# Patient Record
Sex: MALE | Race: WHITE | NOT HISPANIC OR LATINO | Employment: FULL TIME | URBAN - METROPOLITAN AREA
[De-identification: names, ages, dates, MRNs, and addresses within clinical notes are randomized per-mention and may not be internally consistent; named-entity substitution may affect disease eponyms.]

---

## 2021-05-01 ENCOUNTER — APPOINTMENT (EMERGENCY)
Dept: RADIOLOGY | Facility: HOSPITAL | Age: 46
End: 2021-05-01
Payer: COMMERCIAL

## 2021-05-01 ENCOUNTER — HOSPITAL ENCOUNTER (EMERGENCY)
Facility: HOSPITAL | Age: 46
Discharge: HOME | End: 2021-05-01
Attending: EMERGENCY MEDICINE | Admitting: EMERGENCY MEDICINE
Payer: COMMERCIAL

## 2021-05-01 VITALS
OXYGEN SATURATION: 99 % | DIASTOLIC BLOOD PRESSURE: 96 MMHG | HEIGHT: 70 IN | SYSTOLIC BLOOD PRESSURE: 133 MMHG | WEIGHT: 140 LBS | BODY MASS INDEX: 20.04 KG/M2 | TEMPERATURE: 98.3 F | HEART RATE: 76 BPM | RESPIRATION RATE: 18 BRPM

## 2021-05-01 DIAGNOSIS — L30.9 CHRONIC DERMATITIS: ICD-10-CM

## 2021-05-01 DIAGNOSIS — L03.114 CELLULITIS OF LEFT HAND: Primary | ICD-10-CM

## 2021-05-01 PROCEDURE — 73110 X-RAY EXAM OF WRIST: CPT

## 2021-05-01 PROCEDURE — 99284 EMERGENCY DEPT VISIT MOD MDM: CPT | Performed by: EMERGENCY MEDICINE

## 2021-05-01 PROCEDURE — 99283 EMERGENCY DEPT VISIT LOW MDM: CPT

## 2021-05-01 RX ORDER — CEPHALEXIN 500 MG/1
500 CAPSULE ORAL EVERY 6 HOURS SCHEDULED
Qty: 40 CAPSULE | Refills: 0 | Status: SHIPPED | OUTPATIENT
Start: 2021-05-01 | End: 2021-05-11

## 2021-05-01 RX ORDER — SULFAMETHOXAZOLE AND TRIMETHOPRIM 800; 160 MG/1; MG/1
1 TABLET ORAL ONCE
Status: COMPLETED | OUTPATIENT
Start: 2021-05-01 | End: 2021-05-01

## 2021-05-01 RX ORDER — SULFAMETHOXAZOLE AND TRIMETHOPRIM 800; 160 MG/1; MG/1
1 TABLET ORAL 2 TIMES DAILY
Qty: 14 TABLET | Refills: 0 | Status: SHIPPED | OUTPATIENT
Start: 2021-05-01 | End: 2021-05-08

## 2021-05-01 RX ORDER — TRIAMCINOLONE ACETONIDE 1 MG/G
CREAM TOPICAL 2 TIMES DAILY
Qty: 30 G | Refills: 0 | Status: SHIPPED | OUTPATIENT
Start: 2021-05-01

## 2021-05-01 RX ORDER — CEPHALEXIN 250 MG/1
500 CAPSULE ORAL ONCE
Status: COMPLETED | OUTPATIENT
Start: 2021-05-01 | End: 2021-05-01

## 2021-05-01 RX ADMIN — CEPHALEXIN 500 MG: 250 CAPSULE ORAL at 13:53

## 2021-05-01 RX ADMIN — SULFAMETHOXAZOLE AND TRIMETHOPRIM 1 TABLET: 800; 160 TABLET ORAL at 13:53

## 2021-05-01 NOTE — ED PROVIDER NOTES
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 and has areas where he thinks he has been irritated or developed dermatitis from his concrete exposure  Patient reports that he has rash primarily just on his hands opened areas that are sore and red  He reports that now 1 area on the top of his left hand seems more red  Patient reports he twisted his left wrist working other day and he was concerned he broke his wrist   He reports swelling over the dorsum of his wrist and also over the top of his left hand  He denies any fever or chills  There is no redness going up his arms  He denies any difficulty using hand  Denies any direct trauma  Past medical history shoulder surgery,  Family history noncontributory   Social history on methadone, smoker  None       History reviewed  No pertinent past medical history  Past Surgical History:   Procedure Laterality Date    SHOULDER SURGERY         History reviewed  No pertinent family history  I have reviewed and agree with the history as documented  E-Cigarette/Vaping    E-Cigarette Use Never User      E-Cigarette/Vaping Substances     Social History     Tobacco Use    Smoking status: Current Every Day Smoker     Packs/day: 1 00     Types: Cigarettes    Smokeless tobacco: Never Used   Substance Use Topics    Alcohol use: Never     Frequency: Never    Drug use: Yes     Types: Marijuana     Comment: methadone       Review of Systems   Constitutional: Negative for fever  HENT: Negative for congestion  Eyes: Negative for pain and redness     Respiratory: Negative for cough and shortness of breath  Cardiovascular: Negative for chest pain  Gastrointestinal: Negative for abdominal pain and vomiting  Skin: Positive for rash  Reports left wrist pain    Physical Exam  Physical Exam  Vitals signs and nursing note reviewed  Constitutional:       Appearance: He is well-developed  HENT:      Head: Normocephalic  Right Ear: External ear normal       Left Ear: External ear normal       Nose: Nose normal    Eyes:      General: Lids are normal       Pupils: Pupils are equal, round, and reactive to light  Neck:      Musculoskeletal: Normal range of motion and neck supple  Pulmonary:      Effort: Pulmonary effort is normal  No respiratory distress  Musculoskeletal: Normal range of motion  General: No deformity  Comments: There is mild tenderness over the dorsum of the left wrist, there is an area of erythema essentially at the base of the thumb over the dorsal surface of the left hand approximately 3 x 4 cm consistent with possible cellulitis  Patient has multiple areas of skin breakdown on his hands which appear chronic  Possible dermatitis, possible recurrent skin infections  Skin:     General: Skin is warm and dry  Comments: Multiple areas of skin breakdown, redness with chronic dermatitis very thick weathered skin  No palpable abscess no indication for incision and drainage, no area ascending cellulitis above the wrist   No tendon or nerve involvement   Neurological:      Mental Status: He is alert and oriented to person, place, and time           Vital Signs  ED Triage Vitals [05/01/21 1233]   Temperature Pulse Respirations Blood Pressure SpO2   98 3 °F (36 8 °C) 86 18 160/85 97 %      Temp Source Heart Rate Source Patient Position - Orthostatic VS BP Location FiO2 (%)   Oral Monitor Sitting Right arm --      Pain Score       --           Vitals:    05/01/21 1233 05/01/21 1350   BP: 160/85 133/96   Pulse: 86 76   Patient Position - Orthostatic VS: Sitting Sitting         Visual Acuity      ED Medications  Medications   sulfamethoxazole-trimethoprim (BACTRIM DS) 800-160 mg per tablet 1 tablet (1 tablet Oral Given 5/1/21 1353)   cephalexin (KEFLEX) capsule 500 mg (500 mg Oral Given 5/1/21 1353)       Diagnostic Studies  Results Reviewed     None                 XR wrist 3+ views LEFT   Final Result by Frank Rubio DO (05/01 4757)      No acute osseous abnormality  Soft tissue edema about the hand and wrist       Workstation performed: [id number]                    Procedures  Procedures         ED Course          x-ray left wrist showed no fracture no dislocation no bony lesions, interpreted by me I was initial   MDM medical decision making 57-year-old male reports chronic skin breakdown of both of his hands he believes possibly secondary to concrete exposure  Patient has multiple small areas denuded skin consistent with open scabs on both hands  We discussed steroid cream to reduce inflammation  We discussed antibiotics to cover skin infection he appears to have a cellulitis over the dorsum of his left hand  We discussed follow-up with Hand surgery  Disposition  Final diagnoses:   Cellulitis of left hand   Chronic dermatitis     Time reflects when diagnosis was documented in both MDM as applicable and the Disposition within this note     Time User Action Codes Description Comment    5/1/2021  1:06 PM Sakshi Morales [P82 [id number]] Cellulitis of left hand     5/1/2021  1:06 PM Sakshi Morales [L30 9] Chronic dermatitis       ED Disposition     ED Disposition Condition Date/Time Comment    Discharge Stable Sat May 1, 2021  1:23 PM Maged Coley discharge to home/self care              Follow-up Information     Follow up With Specialties Details Why Donn Agrawal MD Orthopedic Surgery, Hand Surgery, Orthopedics   [facility]  [address]  [address] MD [facility]   [facility]  [phone]            Discharge Medication List as of 5/1/2021  1:23 PM      START taking these medications    Details   cephalexin (KEFLEX) 500 mg capsule Take 1 capsule (500 mg total) by mouth every 6 (six) hours for 10 days, Starting Sat 5/1/2021, Until Tue 5/11/2021, Normal      sulfamethoxazole-trimethoprim (BACTRIM DS) 800-160 mg per tablet Take 1 tablet by mouth 2 (two) times a day for 7 days smx-tmp DS (BACTRIM) 800-160 mg tabs (1tab q12 D10), Starting Sat 5/1/2021, Until Sat 5/8/2021, Normal      triamcinolone (KENALOG) 0 1 % cream Apply topically 2 (two) times a day Both hands, Starting Sat 5/1/2021, Normal           No discharge procedures on file      PDMP Review     None          ED Provider  Electronically Signed by           Glenys Salinas MD  05/01/21 2011

## 2021-05-01 NOTE — DISCHARGE INSTRUCTIONS
You have a skin infection of your left hand  Keflex every  6 hours to fight infection  Bactrim twice daily to fight infection  You have a chronic dermatitis of both hands, triamcinolone cream tear hand surgeries redness and inflammation  Follow up with Hand surgery and Dermatology as needed

## 2021-10-26 ENCOUNTER — HOSPITAL ENCOUNTER (EMERGENCY)
Facility: HOSPITAL | Age: 46
Discharge: LEFT BEFORE DISCHARGE | End: 2021-10-26
Attending: EMERGENCY MEDICINE
Payer: COMMERCIAL

## 2021-10-26 VITALS
WEIGHT: 135 LBS | HEART RATE: 89 BPM | TEMPERATURE: 98.8 F | BODY MASS INDEX: 19.99 KG/M2 | DIASTOLIC BLOOD PRESSURE: 76 MMHG | SYSTOLIC BLOOD PRESSURE: 110 MMHG | RESPIRATION RATE: 19 BRPM | OXYGEN SATURATION: 98 % | HEIGHT: 69 IN

## 2021-10-26 LAB
ALBUMIN SERPL BCP-MCNC: 3.9 G/DL (ref 3.5–5)
ALP SERPL-CCNC: 136 U/L (ref 46–116)
ALT SERPL W P-5'-P-CCNC: 13 U/L (ref 12–78)
ANION GAP SERPL CALCULATED.3IONS-SCNC: 14 MMOL/L (ref 4–13)
AST SERPL W P-5'-P-CCNC: 20 U/L (ref 5–45)
BASOPHILS # BLD AUTO: 0.01 THOUSANDS/ΜL (ref 0–0.1)
BASOPHILS NFR BLD AUTO: 0 % (ref 0–1)
BILIRUB SERPL-MCNC: 0.42 MG/DL (ref 0.2–1)
BUN SERPL-MCNC: 12 MG/DL (ref 5–25)
CALCIUM SERPL-MCNC: 9.4 MG/DL (ref 8.3–10.1)
CHLORIDE SERPL-SCNC: 100 MMOL/L (ref 100–108)
CO2 SERPL-SCNC: 24 MMOL/L (ref 21–32)
CREAT SERPL-MCNC: 0.78 MG/DL (ref 0.6–1.3)
EOSINOPHIL # BLD AUTO: 0.01 THOUSAND/ΜL (ref 0–0.61)
EOSINOPHIL NFR BLD AUTO: 0 % (ref 0–6)
ERYTHROCYTE [DISTWIDTH] IN BLOOD BY AUTOMATED COUNT: 15.1 % (ref 11.6–15.1)
GFR SERPL CREATININE-BSD FRML MDRD: 110 ML/MIN/1.73SQ M
GLUCOSE SERPL-MCNC: 114 MG/DL (ref 65–140)
HCT VFR BLD AUTO: 42.5 % (ref 36.5–49.3)
HGB BLD-MCNC: 14.2 G/DL (ref 12–17)
IMM GRANULOCYTES # BLD AUTO: 0.07 THOUSAND/UL (ref 0–0.2)
IMM GRANULOCYTES NFR BLD AUTO: 1 % (ref 0–2)
LIPASE SERPL-CCNC: 73 U/L (ref 73–393)
LYMPHOCYTES # BLD AUTO: 0.75 THOUSANDS/ΜL (ref 0.6–4.47)
LYMPHOCYTES NFR BLD AUTO: 7 % (ref 14–44)
MCH RBC QN AUTO: 28.1 PG (ref 26.8–34.3)
MCHC RBC AUTO-ENTMCNC: 33.4 G/DL (ref 31.4–37.4)
MCV RBC AUTO: 84 FL (ref 82–98)
MONOCYTES # BLD AUTO: 0.65 THOUSAND/ΜL (ref 0.17–1.22)
MONOCYTES NFR BLD AUTO: 6 % (ref 4–12)
NEUTROPHILS # BLD AUTO: 10.03 THOUSANDS/ΜL (ref 1.85–7.62)
NEUTS SEG NFR BLD AUTO: 86 % (ref 43–75)
NRBC BLD AUTO-RTO: 0 /100 WBCS
PLATELET # BLD AUTO: 359 THOUSANDS/UL (ref 149–390)
PMV BLD AUTO: 9.4 FL (ref 8.9–12.7)
POTASSIUM SERPL-SCNC: 3.9 MMOL/L (ref 3.5–5.3)
PROT SERPL-MCNC: 8.7 G/DL (ref 6.4–8.2)
RBC # BLD AUTO: 5.06 MILLION/UL (ref 3.88–5.62)
SODIUM SERPL-SCNC: 138 MMOL/L (ref 136–145)
WBC # BLD AUTO: 11.52 THOUSAND/UL (ref 4.31–10.16)

## 2021-10-26 PROCEDURE — 85025 COMPLETE CBC W/AUTO DIFF WBC: CPT | Performed by: EMERGENCY MEDICINE

## 2021-10-26 PROCEDURE — 80053 COMPREHEN METABOLIC PANEL: CPT | Performed by: EMERGENCY MEDICINE

## 2021-10-26 PROCEDURE — 36415 COLL VENOUS BLD VENIPUNCTURE: CPT

## 2021-10-26 PROCEDURE — 83690 ASSAY OF LIPASE: CPT | Performed by: EMERGENCY MEDICINE

## 2021-10-26 RX ORDER — ONDANSETRON 2 MG/ML
4 INJECTION INTRAMUSCULAR; INTRAVENOUS ONCE
Status: DISCONTINUED | OUTPATIENT
Start: 2021-10-26 | End: 2021-10-26 | Stop reason: HOSPADM

## 2022-01-07 ENCOUNTER — HOSPITAL ENCOUNTER (EMERGENCY)
Facility: HOSPITAL | Age: 47
Discharge: HOME | End: 2022-01-07
Attending: EMERGENCY MEDICINE | Admitting: EMERGENCY MEDICINE

## 2022-01-07 DIAGNOSIS — L02.415 CELLULITIS AND ABSCESS OF RIGHT LEG: Primary | ICD-10-CM

## 2022-01-07 DIAGNOSIS — L03.115 CELLULITIS AND ABSCESS OF RIGHT LEG: Primary | ICD-10-CM

## 2022-01-07 PROCEDURE — 90715 TDAP VACCINE 7 YRS/> IM: CPT | Performed by: EMERGENCY MEDICINE

## 2022-01-07 PROCEDURE — 99285 EMERGENCY DEPT VISIT HI MDM: CPT | Performed by: EMERGENCY MEDICINE

## 2022-01-07 PROCEDURE — 90471 IMMUNIZATION ADMIN: CPT

## 2022-01-07 PROCEDURE — 10061 I&D ABSCESS COMP/MULTIPLE: CPT | Performed by: EMERGENCY MEDICINE

## 2022-01-07 PROCEDURE — 99283 EMERGENCY DEPT VISIT LOW MDM: CPT

## 2022-01-07 RX ORDER — CEPHALEXIN 250 MG/1
500 CAPSULE ORAL ONCE
Status: COMPLETED | OUTPATIENT
Start: 2022-01-07 | End: 2022-01-07

## 2022-01-07 RX ORDER — NAPROXEN 250 MG/1
250 TABLET ORAL 2 TIMES DAILY WITH MEALS
Qty: 20 TABLET | Refills: 0 | Status: SHIPPED | OUTPATIENT
Start: 2022-01-07

## 2022-01-07 RX ORDER — LIDOCAINE HYDROCHLORIDE AND EPINEPHRINE 20; 5 MG/ML; UG/ML
1 INJECTION, SOLUTION EPIDURAL; INFILTRATION; INTRACAUDAL; PERINEURAL ONCE
Status: COMPLETED | OUTPATIENT
Start: 2022-01-07 | End: 2022-01-07

## 2022-01-07 RX ORDER — SULFAMETHOXAZOLE AND TRIMETHOPRIM 800; 160 MG/1; MG/1
1 TABLET ORAL ONCE
Status: COMPLETED | OUTPATIENT
Start: 2022-01-07 | End: 2022-01-07

## 2022-01-07 RX ORDER — CEPHALEXIN 250 MG/1
500 CAPSULE ORAL EVERY 6 HOURS SCHEDULED
Qty: 56 CAPSULE | Refills: 0 | Status: SHIPPED | OUTPATIENT
Start: 2022-01-07 | End: 2022-01-14

## 2022-01-07 RX ORDER — SULFAMETHOXAZOLE AND TRIMETHOPRIM 800; 160 MG/1; MG/1
1 TABLET ORAL 2 TIMES DAILY
Qty: 14 TABLET | Refills: 0 | Status: SHIPPED | OUTPATIENT
Start: 2022-01-07 | End: 2022-01-14

## 2022-01-07 RX ADMIN — LIDOCAINE HYDROCHLORIDE AND EPINEPHRINE 1 ML: 20; 5 INJECTION, SOLUTION EPIDURAL; INFILTRATION; INTRACAUDAL; PERINEURAL at 21:46

## 2022-01-07 RX ADMIN — CEPHALEXIN 500 MG: 250 CAPSULE ORAL at 21:45

## 2022-01-07 RX ADMIN — TETANUS TOXOID, REDUCED DIPHTHERIA TOXOID AND ACELLULAR PERTUSSIS VACCINE, ADSORBED 0.5 ML: 5; 2.5; 8; 8; 2.5 SUSPENSION INTRAMUSCULAR at 22:07

## 2022-01-07 RX ADMIN — SULFAMETHOXAZOLE AND TRIMETHOPRIM 1 TABLET: 800; 160 TABLET ORAL at 21:45

## 2022-01-08 VITALS
WEIGHT: 135 LBS | SYSTOLIC BLOOD PRESSURE: 123 MMHG | HEART RATE: 87 BPM | BODY MASS INDEX: 19.94 KG/M2 | OXYGEN SATURATION: 97 % | DIASTOLIC BLOOD PRESSURE: 76 MMHG | TEMPERATURE: 99.8 F | RESPIRATION RATE: 18 BRPM

## 2022-01-08 NOTE — ED PROVIDER NOTES
Pt Name: Ricci Calhoun  MRN: [id number]  [place] 1975  Age/Sex: 42 y o  male  Date of evaluation: 1/7/2022  PCP: No primary care provider on file  CHIEF COMPLAINT    Chief Complaint   Patient presents with    Leg Swelling     cut leg on possibly a piece of metal or wire, noticed 2 days ago that leg was swollen red and draining  HPI    42 y o  male presenting with right leg swelling and pain  Patient states he cut his leg on a piece of metal or wire several days ago while working on some rebar, states that 2 days ago the leg became red and slightly swollen  He states that he saw a boil on the skin, attempt to squeeze the pus out, express some pus but was not able to get it all out  He has had increased redness since then  Denies fever, nausea, vomiting, diarrhea,  other symptoms  He does not remember his last tetanus shot  Patient currently maintained on methadone therapy, states his last IV drug use was approximately 3 weeks ago  HPI      Past Medical and Surgical History    History reviewed  No pertinent past medical history  Past Surgical History:   Procedure Laterality Date    SHOULDER SURGERY         History reviewed  No pertinent family history  Social History     Tobacco Use    Smoking status: Current Every Day Smoker     Packs/day: 1 00     Types: Cigarettes    Smokeless tobacco: Never Used   Vaping Use    Vaping Use: Never used   Substance Use Topics    Alcohol use: Never    Drug use: Yes     Types: Marijuana     Comment: methadone           Allergies    No Known Allergies    Home Medications    Prior to Admission medications    Medication Sig Start Date End Date Taking? Authorizing Provider   triamcinolone (KENALOG) 0 1 % cream Apply topically 2 (two) times a day Both hands 5/1/21   Alize Arshad MD           Review of Systems    Review of Systems   Constitutional: Negative for appetite change, chills and diaphoresis     HENT: Negative for drooling, facial swelling, trouble swallowing and voice change  Respiratory: Negative for apnea, shortness of breath and wheezing  Cardiovascular: Negative for chest pain and leg swelling  Gastrointestinal: Negative for abdominal distention, abdominal pain, diarrhea, nausea and vomiting  Genitourinary: Negative for dysuria and urgency  Musculoskeletal: Negative for arthralgias, back pain, gait problem and neck pain  Skin: Positive for rash and wound  Negative for color change  Neurological: Negative for seizures, speech difficulty, weakness and headaches  Psychiatric/Behavioral: Negative for agitation, behavioral problems and dysphoric mood  The patient is not nervous/anxious  All other systems reviewed and negative  Physical Exam      ED Triage Vitals   Temperature Pulse Respirations Blood Pressure SpO2   01/07/22 1605 01/07/22 1605 01/07/22 1605 01/07/22 1605 01/07/22 1605   99 8 °F (37 7 °C) (!) 108 18 140/77 98 %      Temp Source Heart Rate Source Patient Position - Orthostatic VS BP Location FiO2 (%)   01/07/22 1605 01/07/22 1605 01/07/22 1605 01/07/22 1605 --   Oral Monitor Sitting Right arm       Pain Score       01/07/22 2223       7               Physical Exam  Vitals and nursing note reviewed  Constitutional:       Appearance: He is well-developed  HENT:      Head: Normocephalic and atraumatic  Right Ear: External ear normal       Left Ear: External ear normal    Eyes:      Conjunctiva/sclera: Conjunctivae normal       Pupils: Pupils are equal, round, and reactive to light  Neck:      Trachea: No tracheal deviation  Cardiovascular:      Rate and Rhythm: Normal rate and regular rhythm  Heart sounds: Normal heart sounds  No murmur heard  Pulmonary:      Effort: Pulmonary effort is normal  No respiratory distress  Breath sounds: Normal breath sounds  No stridor  No wheezing or rales  Abdominal:      General: There is no distension  Palpations: Abdomen is soft  Tenderness: There is no abdominal tenderness  There is no guarding or rebound  Musculoskeletal:         General: Swelling and tenderness present  No deformity  Normal range of motion  Cervical back: Normal range of motion and neck supple  Comments: Right lower extremity swollen, erythematous, tender to palpation, approximately 3 x 5 cm fluctuant area noted to the anterior aspect of the right lower leg with small purulent drainage  Strength sensation pulse and cap refill intact distal    Skin:     General: Skin is warm and dry  Findings: No rash  Neurological:      Mental Status: He is alert and oriented to person, place, and time  Psychiatric:         Behavior: Behavior normal          Thought Content: Thought content normal          Judgment: Judgment normal               Diagnostic Results      Labs:    Results Reviewed     None          All labs reviewed and utilized in the medical decision making process    Radiology:    No orders to display       All radiology studies independently viewed by me and interpreted by the radiologist     Procedure    Incision and drain    Date/Time: 1/7/2022 10:00 PM  Performed by: Julia Montes MD  Authorized by: Julia Montes MD   Universal Protocol:  Risks and benefits: risks, benefits and alternatives were discussed  Consent given by: patient  Time out: Immediately prior to procedure a "time out" was called to verify the correct patient, procedure, equipment, support staff and site/side marked as required  Patient identity confirmed: provided demographic data      Patient location:  ED  Location:     Type:  Abscess    Size:  3x5 cm    Location:  Lower extremity    Lower extremity location:  R leg  Pre-procedure details:     Skin preparation:  Betadine  Anesthesia (see MAR for exact dosages):      Anesthesia method:  Local infiltration    Local anesthetic:  Lidocaine 2% WITH epi  Procedure details:     Complexity:  Intermediate    Incision types: Single straight    Scalpel blade:  11    Approach:  Open    Incision depth:  Subcutaneous    Wound management:  Probed and deloculated and extensive cleaning    Drainage:  Purulent    Drainage amount:  Copious    Wound treatment:  Wound left open    Packing materials:  None  Post-procedure details:     Patient tolerance of procedure: Tolerated well, no immediate complications            ED Course of Care and Re-Assessments      Symptoms improved substantially with antibiotics and incision and drainage  Tetanus updated  Medications   sulfamethoxazole-trimethoprim (BACTRIM DS) 800-160 mg per tablet 1 tablet (1 tablet Oral Given 1/7/22 2145)   cephalexin (KEFLEX) capsule 500 mg (500 mg Oral Given 1/7/22 2145)   lidocaine-epinephrine (XYLOCAINE-MPF/EPINEPHRINE) 2 %-1:200,000 injection 1 mL (1 mL Infiltration Given 1/7/22 2146)   tetanus-diphtheria-acellular pertussis (BOOSTRIX) IM injection 0 5 mL (0 5 mL Intramuscular Given 1/7/22 2207)           FINAL IMPRESSION    Final diagnoses:   Cellulitis and abscess of right leg         DISPOSITION/PLAN    Presentation as above with cellulitis and abscess of the right leg  No systemic symptoms  Patient started on broad-spectrum antibiotics with coverage for staph and strep as well as MRSA, tetanus updated, incised and drained as above  Counseled extensively regarding wound care, discharged strict return precautions, follow up primary care doctor or with ER for wound check in 48 hours  Hemodynamically stable and comfortable at time of discharge  Time reflects when diagnosis was documented in both MDM as applicable and the Disposition within this note     Time User Action Codes Description Comment    1/7/2022 10:10 PM Stacey Nielsen Add [V93 [id number],  L02 415] Cellulitis and abscess of right leg       ED Disposition     ED Disposition Condition Date/Time Comment    Discharge Stable Fri Jan 7, 2022 10:09 PM Tiana Miller discharge to home/self care              Follow-up Information     Follow up With Specialties Details Why Contact Info Additional [address] [facility] Emergency Medicine Go to  If symptoms worsen or in 48 hours for a wound recheck [address] [facility] Emergency Department, [address] [place], [place], [address] [facility] Internal Medicine Call in 3 days To establish primary care  and schedule close outpatient follow-up [address]:    [address] [place] [address] Emergency Department  [address] [place] [address] [phone]  Go to   If symptoms worsen or in 48 hours for a wound recheck    Dave Carter DO  [address] [address]  [phone]    Call in 3 days  To establish primary care  and schedule close outpatient follow-up      DISCHARGE MEDICATIONS:    Discharge Medication List as of 1/7/2022 10:11 PM      START taking these medications    Details   cephalexin (KEFLEX) 250 mg capsule Take 2 capsules (500 mg total) by mouth every 6 (six) hours for 7 days, Starting Fri 1/7/2022, Until Fri 1/14/2022, Print      sulfamethoxazole-trimethoprim (BACTRIM DS) 800-160 mg per tablet Take 1 tablet by mouth 2 (two) times a day for 7 days smx-tmp DS (BACTRIM) 800-160 mg tabs (1tab q12 D10), Starting Fri 1/7/2022, Until Fri 1/14/2022, Print         CONTINUE these medications which have NOT CHANGED    Details   triamcinolone (KENALOG) 0 1 % cream Apply topically 2 (two) times a day Both hands, Starting Sat 5/1/2021, Normal             No discharge procedures on file  Shaun Stoll MD    Portions of the record may have been created with voice recognition software    Occasional wrong word or "sound alike" substitutions may have occurred due to the inherent limitations of voice recognition software    Please read the chart carefully and recognize, using context, where substitutions have occurred     Shaun Stoll MD  01/08/22 0124

## 2022-05-15 ENCOUNTER — APPOINTMENT (EMERGENCY)
Dept: RADIOLOGY | Facility: HOSPITAL | Age: 47
DRG: 364 | End: 2022-05-15
Payer: COMMERCIAL

## 2022-05-15 ENCOUNTER — ANESTHESIA EVENT (INPATIENT)
Dept: PERIOP | Facility: HOSPITAL | Age: 47
DRG: 364 | End: 2022-05-15
Payer: COMMERCIAL

## 2022-05-15 ENCOUNTER — APPOINTMENT (EMERGENCY)
Dept: CT IMAGING | Facility: HOSPITAL | Age: 47
DRG: 364 | End: 2022-05-15
Payer: COMMERCIAL

## 2022-05-15 ENCOUNTER — HOSPITAL ENCOUNTER (INPATIENT)
Facility: HOSPITAL | Age: 47
LOS: 1 days | Discharge: HOME | DRG: 364 | End: 2022-05-16
Attending: EMERGENCY MEDICINE | Admitting: SURGERY
Payer: COMMERCIAL

## 2022-05-15 ENCOUNTER — ANESTHESIA (INPATIENT)
Dept: PERIOP | Facility: HOSPITAL | Age: 47
DRG: 364 | End: 2022-05-15
Payer: COMMERCIAL

## 2022-05-15 DIAGNOSIS — L02.91 ABSCESS: Primary | ICD-10-CM

## 2022-05-15 DIAGNOSIS — F19.10 IV DRUG ABUSE (HCC): Chronic | ICD-10-CM

## 2022-05-15 DIAGNOSIS — L02.414 ABSCESS OF FOREARM, LEFT: ICD-10-CM

## 2022-05-15 PROBLEM — F17.200 SMOKING: Status: ACTIVE | Noted: 2022-05-15

## 2022-05-15 LAB
4HR DELTA HS TROPONIN: <0 NG/L
ALBUMIN SERPL BCP-MCNC: 3.5 G/DL (ref 3.5–5)
ALP SERPL-CCNC: 102 U/L (ref 46–116)
ALT SERPL W P-5'-P-CCNC: 17 U/L (ref 12–78)
ANION GAP SERPL CALCULATED.3IONS-SCNC: 9 MMOL/L (ref 4–13)
APTT PPP: 30 SECONDS (ref 23–37)
AST SERPL W P-5'-P-CCNC: 10 U/L (ref 5–45)
ATRIAL RATE: 85 BPM
BASE EX.OXY STD BLDV CALC-SCNC: 47 % (ref 60–80)
BASE EXCESS BLDV CALC-SCNC: 1.1 MMOL/L
BASOPHILS # BLD AUTO: 0.05 THOUSANDS/ΜL (ref 0–0.1)
BASOPHILS NFR BLD AUTO: 0 % (ref 0–1)
BILIRUB SERPL-MCNC: 0.36 MG/DL (ref 0.2–1)
BUN SERPL-MCNC: 21 MG/DL (ref 5–25)
CALCIUM SERPL-MCNC: 8.9 MG/DL (ref 8.3–10.1)
CARDIAC TROPONIN I PNL SERPL HS: 2 NG/L
CARDIAC TROPONIN I PNL SERPL HS: <2 NG/L
CHLORIDE SERPL-SCNC: 95 MMOL/L (ref 100–108)
CO2 SERPL-SCNC: 29 MMOL/L (ref 21–32)
CREAT SERPL-MCNC: 0.99 MG/DL (ref 0.6–1.3)
EOSINOPHIL # BLD AUTO: 0.08 THOUSAND/ΜL (ref 0–0.61)
EOSINOPHIL NFR BLD AUTO: 0 % (ref 0–6)
ERYTHROCYTE [DISTWIDTH] IN BLOOD BY AUTOMATED COUNT: 13.1 % (ref 11.6–15.1)
GFR SERPL CREATININE-BSD FRML MDRD: 91 ML/MIN/1.73SQ M
GLUCOSE SERPL-MCNC: 130 MG/DL (ref 65–140)
HCO3 BLDV-SCNC: 28 MMOL/L (ref 24–30)
HCT VFR BLD AUTO: 41.1 % (ref 36.5–49.3)
HGB BLD-MCNC: 13.6 G/DL (ref 12–17)
IMM GRANULOCYTES # BLD AUTO: 0.13 THOUSAND/UL (ref 0–0.2)
IMM GRANULOCYTES NFR BLD AUTO: 1 % (ref 0–2)
INR PPP: 1.08 (ref 0.84–1.19)
LACTATE SERPL-SCNC: 0.5 MMOL/L (ref 0.5–2)
LACTATE SERPL-SCNC: 1.8 MMOL/L (ref 0.5–2)
LIPASE SERPL-CCNC: 38 U/L (ref 73–393)
LYMPHOCYTES # BLD AUTO: 1.88 THOUSANDS/ΜL (ref 0.6–4.47)
LYMPHOCYTES NFR BLD AUTO: 10 % (ref 14–44)
MAGNESIUM SERPL-MCNC: 1.9 MG/DL (ref 1.6–2.6)
MCH RBC QN AUTO: 29.9 PG (ref 26.8–34.3)
MCHC RBC AUTO-ENTMCNC: 33.1 G/DL (ref 31.4–37.4)
MCV RBC AUTO: 90 FL (ref 82–98)
MONOCYTES # BLD AUTO: 1.4 THOUSAND/ΜL (ref 0.17–1.22)
MONOCYTES NFR BLD AUTO: 8 % (ref 4–12)
NEUTROPHILS # BLD AUTO: 15.09 THOUSANDS/ΜL (ref 1.85–7.62)
NEUTS SEG NFR BLD AUTO: 81 % (ref 43–75)
NRBC BLD AUTO-RTO: 0 /100 WBCS
NT-PROBNP SERPL-MCNC: 32 PG/ML
O2 CT BLDV-SCNC: 9.4 ML/DL
P AXIS: 78 DEGREES
PCO2 BLDV: 53.8 MM HG (ref 42–50)
PH BLDV: 7.33 [PH] (ref 7.3–7.4)
PLATELET # BLD AUTO: 540 THOUSANDS/UL (ref 149–390)
PMV BLD AUTO: 8.5 FL (ref 8.9–12.7)
PO2 BLDV: 26.7 MM HG (ref 35–45)
POTASSIUM SERPL-SCNC: 4.1 MMOL/L (ref 3.5–5.3)
PR INTERVAL: 146 MS
PROCALCITONIN SERPL-MCNC: <0.05 NG/ML
PROT SERPL-MCNC: 7.8 G/DL (ref 6.4–8.2)
PROTHROMBIN TIME: 13.5 SECONDS (ref 11.6–14.5)
QRS AXIS: 85 DEGREES
QRSD INTERVAL: 88 MS
QT INTERVAL: 358 MS
QTC INTERVAL: 426 MS
RBC # BLD AUTO: 4.55 MILLION/UL (ref 3.88–5.62)
SODIUM SERPL-SCNC: 133 MMOL/L (ref 136–145)
T WAVE AXIS: 86 DEGREES
VENTRICULAR RATE: 85 BPM
WBC # BLD AUTO: 18.63 THOUSAND/UL (ref 4.31–10.16)

## 2022-05-15 PROCEDURE — 87186 SC STD MICRODIL/AGAR DIL: CPT | Performed by: SURGERY

## 2022-05-15 PROCEDURE — 93010 ELECTROCARDIOGRAM REPORT: CPT | Performed by: INTERNAL MEDICINE

## 2022-05-15 PROCEDURE — 99222 1ST HOSP IP/OBS MODERATE 55: CPT | Performed by: SURGERY

## 2022-05-15 PROCEDURE — 87070 CULTURE OTHR SPECIMN AEROBIC: CPT | Performed by: SURGERY

## 2022-05-15 PROCEDURE — 85730 THROMBOPLASTIN TIME PARTIAL: CPT | Performed by: EMERGENCY MEDICINE

## 2022-05-15 PROCEDURE — 83605 ASSAY OF LACTIC ACID: CPT | Performed by: SURGERY

## 2022-05-15 PROCEDURE — 73200 CT UPPER EXTREMITY W/O DYE: CPT

## 2022-05-15 PROCEDURE — 87040 BLOOD CULTURE FOR BACTERIA: CPT | Performed by: EMERGENCY MEDICINE

## 2022-05-15 PROCEDURE — 71046 X-RAY EXAM CHEST 2 VIEWS: CPT

## 2022-05-15 PROCEDURE — 36415 COLL VENOUS BLD VENIPUNCTURE: CPT | Performed by: EMERGENCY MEDICINE

## 2022-05-15 PROCEDURE — G1004 CDSM NDSC: HCPCS

## 2022-05-15 PROCEDURE — 80053 COMPREHEN METABOLIC PANEL: CPT | Performed by: EMERGENCY MEDICINE

## 2022-05-15 PROCEDURE — 84484 ASSAY OF TROPONIN QUANT: CPT | Performed by: EMERGENCY MEDICINE

## 2022-05-15 PROCEDURE — 93005 ELECTROCARDIOGRAM TRACING: CPT

## 2022-05-15 PROCEDURE — 82805 BLOOD GASES W/O2 SATURATION: CPT | Performed by: EMERGENCY MEDICINE

## 2022-05-15 PROCEDURE — 84484 ASSAY OF TROPONIN QUANT: CPT | Performed by: SURGERY

## 2022-05-15 PROCEDURE — 83880 ASSAY OF NATRIURETIC PEPTIDE: CPT | Performed by: EMERGENCY MEDICINE

## 2022-05-15 PROCEDURE — 87205 SMEAR GRAM STAIN: CPT | Performed by: SURGERY

## 2022-05-15 PROCEDURE — 83735 ASSAY OF MAGNESIUM: CPT | Performed by: EMERGENCY MEDICINE

## 2022-05-15 PROCEDURE — 99284 EMERGENCY DEPT VISIT MOD MDM: CPT | Performed by: EMERGENCY MEDICINE

## 2022-05-15 PROCEDURE — 99285 EMERGENCY DEPT VISIT HI MDM: CPT

## 2022-05-15 PROCEDURE — 83690 ASSAY OF LIPASE: CPT | Performed by: EMERGENCY MEDICINE

## 2022-05-15 PROCEDURE — 85025 COMPLETE CBC W/AUTO DIFF WBC: CPT | Performed by: EMERGENCY MEDICINE

## 2022-05-15 PROCEDURE — 87075 CULTR BACTERIA EXCEPT BLOOD: CPT | Performed by: SURGERY

## 2022-05-15 PROCEDURE — 84145 PROCALCITONIN (PCT): CPT | Performed by: EMERGENCY MEDICINE

## 2022-05-15 PROCEDURE — 83605 ASSAY OF LACTIC ACID: CPT | Performed by: EMERGENCY MEDICINE

## 2022-05-15 PROCEDURE — 0J9H0ZZ DRAINAGE OF LEFT LOWER ARM SUBCUTANEOUS TISSUE AND FASCIA, OPEN APPROACH: ICD-10-PCS | Performed by: SURGERY

## 2022-05-15 PROCEDURE — 10061 I&D ABSCESS COMP/MULTIPLE: CPT | Performed by: SURGERY

## 2022-05-15 PROCEDURE — 85610 PROTHROMBIN TIME: CPT | Performed by: EMERGENCY MEDICINE

## 2022-05-15 RX ORDER — VANCOMYCIN HYDROCHLORIDE 1 G/200ML
15 INJECTION, SOLUTION INTRAVENOUS EVERY 12 HOURS
Status: DISCONTINUED | OUTPATIENT
Start: 2022-05-15 | End: 2022-05-16 | Stop reason: HOSPADM

## 2022-05-15 RX ORDER — SODIUM CHLORIDE, SODIUM GLUCONATE, SODIUM ACETATE, POTASSIUM CHLORIDE, MAGNESIUM CHLORIDE, SODIUM PHOSPHATE, DIBASIC, AND POTASSIUM PHOSPHATE .53; .5; .37; .037; .03; .012; .00082 G/100ML; G/100ML; G/100ML; G/100ML; G/100ML; G/100ML; G/100ML
1000 INJECTION, SOLUTION INTRAVENOUS ONCE
Status: COMPLETED | OUTPATIENT
Start: 2022-05-15 | End: 2022-05-15

## 2022-05-15 RX ORDER — ONDANSETRON 2 MG/ML
4 INJECTION INTRAMUSCULAR; INTRAVENOUS EVERY 6 HOURS PRN
Status: DISCONTINUED | OUTPATIENT
Start: 2022-05-15 | End: 2022-05-16 | Stop reason: HOSPADM

## 2022-05-15 RX ORDER — HEPARIN SODIUM 5000 [USP'U]/ML
INJECTION, SOLUTION INTRAVENOUS; SUBCUTANEOUS AS NEEDED
Status: DISCONTINUED | OUTPATIENT
Start: 2022-05-15 | End: 2022-05-15

## 2022-05-15 RX ORDER — CEFAZOLIN SODIUM 1 G/50ML
1000 SOLUTION INTRAVENOUS EVERY 8 HOURS
Status: ACTIVE | OUTPATIENT
Start: 2022-05-15 | End: 2022-05-15

## 2022-05-15 RX ORDER — SUCCINYLCHOLINE/SOD CL,ISO/PF 100 MG/5ML
SYRINGE (ML) INTRAVENOUS AS NEEDED
Status: DISCONTINUED | OUTPATIENT
Start: 2022-05-15 | End: 2022-05-15

## 2022-05-15 RX ORDER — MIDAZOLAM HYDROCHLORIDE 2 MG/2ML
INJECTION, SOLUTION INTRAMUSCULAR; INTRAVENOUS AS NEEDED
Status: DISCONTINUED | OUTPATIENT
Start: 2022-05-15 | End: 2022-05-15

## 2022-05-15 RX ORDER — PROPOFOL 10 MG/ML
INJECTION, EMULSION INTRAVENOUS AS NEEDED
Status: DISCONTINUED | OUTPATIENT
Start: 2022-05-15 | End: 2022-05-15

## 2022-05-15 RX ORDER — PROMETHAZINE HYDROCHLORIDE 25 MG/ML
12.5 INJECTION, SOLUTION INTRAMUSCULAR; INTRAVENOUS ONCE AS NEEDED
Status: DISCONTINUED | OUTPATIENT
Start: 2022-05-15 | End: 2022-05-15 | Stop reason: HOSPADM

## 2022-05-15 RX ORDER — IBUPROFEN 800 MG/1
TABLET ORAL EVERY 6 HOURS PRN
COMMUNITY

## 2022-05-15 RX ORDER — MAGNESIUM HYDROXIDE 1200 MG/15ML
LIQUID ORAL AS NEEDED
Status: DISCONTINUED | OUTPATIENT
Start: 2022-05-15 | End: 2022-05-15 | Stop reason: HOSPADM

## 2022-05-15 RX ORDER — FENTANYL CITRATE 50 UG/ML
INJECTION, SOLUTION INTRAMUSCULAR; INTRAVENOUS AS NEEDED
Status: DISCONTINUED | OUTPATIENT
Start: 2022-05-15 | End: 2022-05-15

## 2022-05-15 RX ORDER — HEPARIN SODIUM 5000 [USP'U]/ML
5000 INJECTION, SOLUTION INTRAVENOUS; SUBCUTANEOUS EVERY 8 HOURS SCHEDULED
Status: DISCONTINUED | OUTPATIENT
Start: 2022-05-15 | End: 2022-05-16 | Stop reason: HOSPADM

## 2022-05-15 RX ORDER — ACETAMINOPHEN 325 MG/1
975 TABLET ORAL EVERY 8 HOURS PRN
Status: DISCONTINUED | OUTPATIENT
Start: 2022-05-15 | End: 2022-05-16 | Stop reason: HOSPADM

## 2022-05-15 RX ORDER — DEXAMETHASONE SODIUM PHOSPHATE 10 MG/ML
INJECTION, SOLUTION INTRAMUSCULAR; INTRAVENOUS AS NEEDED
Status: DISCONTINUED | OUTPATIENT
Start: 2022-05-15 | End: 2022-05-15

## 2022-05-15 RX ORDER — DIPHENHYDRAMINE HYDROCHLORIDE 50 MG/ML
12.5 INJECTION INTRAMUSCULAR; INTRAVENOUS ONCE AS NEEDED
Status: DISCONTINUED | OUTPATIENT
Start: 2022-05-15 | End: 2022-05-15 | Stop reason: HOSPADM

## 2022-05-15 RX ORDER — CEFAZOLIN SODIUM 1 G/50ML
SOLUTION INTRAVENOUS AS NEEDED
Status: DISCONTINUED | OUTPATIENT
Start: 2022-05-15 | End: 2022-05-15

## 2022-05-15 RX ORDER — CEFAZOLIN SODIUM 1 G/50ML
1000 SOLUTION INTRAVENOUS
Status: DISPENSED | OUTPATIENT
Start: 2022-05-15 | End: 2022-05-16

## 2022-05-15 RX ORDER — ONDANSETRON 2 MG/ML
INJECTION INTRAMUSCULAR; INTRAVENOUS AS NEEDED
Status: DISCONTINUED | OUTPATIENT
Start: 2022-05-15 | End: 2022-05-15

## 2022-05-15 RX ORDER — LIDOCAINE HYDROCHLORIDE 20 MG/ML
INJECTION, SOLUTION EPIDURAL; INFILTRATION; INTRACAUDAL; PERINEURAL AS NEEDED
Status: DISCONTINUED | OUTPATIENT
Start: 2022-05-15 | End: 2022-05-15

## 2022-05-15 RX ORDER — FENTANYL CITRATE/PF 50 MCG/ML
50 SYRINGE (ML) INJECTION
Status: DISCONTINUED | OUTPATIENT
Start: 2022-05-15 | End: 2022-05-15 | Stop reason: HOSPADM

## 2022-05-15 RX ORDER — SODIUM CHLORIDE, SODIUM LACTATE, POTASSIUM CHLORIDE, CALCIUM CHLORIDE 600; 310; 30; 20 MG/100ML; MG/100ML; MG/100ML; MG/100ML
125 INJECTION, SOLUTION INTRAVENOUS CONTINUOUS
Status: DISCONTINUED | OUTPATIENT
Start: 2022-05-15 | End: 2022-05-15

## 2022-05-15 RX ORDER — HYDROMORPHONE HCL/PF 1 MG/ML
0.5 SYRINGE (ML) INJECTION
Status: DISCONTINUED | OUTPATIENT
Start: 2022-05-15 | End: 2022-05-15 | Stop reason: HOSPADM

## 2022-05-15 RX ADMIN — SODIUM CHLORIDE, SODIUM LACTATE, POTASSIUM CHLORIDE, AND CALCIUM CHLORIDE: .6; .31; .03; .02 INJECTION, SOLUTION INTRAVENOUS at 12:36

## 2022-05-15 RX ADMIN — FENTANYL CITRATE 100 MCG: 50 INJECTION, SOLUTION INTRAMUSCULAR; INTRAVENOUS at 12:41

## 2022-05-15 RX ADMIN — VANCOMYCIN HYDROCHLORIDE 1000 MG: 1 INJECTION, SOLUTION INTRAVENOUS at 13:54

## 2022-05-15 RX ADMIN — ONDANSETRON 4 MG: 2 INJECTION INTRAMUSCULAR; INTRAVENOUS at 13:04

## 2022-05-15 RX ADMIN — FENTANYL CITRATE 50 MCG: 50 INJECTION INTRAMUSCULAR; INTRAVENOUS at 13:50

## 2022-05-15 RX ADMIN — SODIUM CHLORIDE, SODIUM GLUCONATE, SODIUM ACETATE, POTASSIUM CHLORIDE, MAGNESIUM CHLORIDE, SODIUM PHOSPHATE, DIBASIC, AND POTASSIUM PHOSPHATE 1000 ML: .53; .5; .37; .037; .03; .012; .00082 INJECTION, SOLUTION INTRAVENOUS at 12:01

## 2022-05-15 RX ADMIN — Medication 30 MG: at 12:51

## 2022-05-15 RX ADMIN — PROPOFOL 200 MG: 10 INJECTION, EMULSION INTRAVENOUS at 12:41

## 2022-05-15 RX ADMIN — Medication 100 MG: at 12:41

## 2022-05-15 RX ADMIN — PROPOFOL 50 MG: 10 INJECTION, EMULSION INTRAVENOUS at 12:42

## 2022-05-15 RX ADMIN — SODIUM CHLORIDE, SODIUM GLUCONATE, SODIUM ACETATE, POTASSIUM CHLORIDE, MAGNESIUM CHLORIDE, SODIUM PHOSPHATE, DIBASIC, AND POTASSIUM PHOSPHATE 1000 ML: .53; .5; .37; .037; .03; .012; .00082 INJECTION, SOLUTION INTRAVENOUS at 11:58

## 2022-05-15 RX ADMIN — MIDAZOLAM 2 MG: 1 INJECTION INTRAMUSCULAR; INTRAVENOUS at 12:38

## 2022-05-15 RX ADMIN — LIDOCAINE HYDROCHLORIDE 100 MG: 20 INJECTION, SOLUTION EPIDURAL; INFILTRATION; INTRACAUDAL; PERINEURAL at 12:41

## 2022-05-15 RX ADMIN — HEPARIN SODIUM 5000 UNITS: 5000 INJECTION INTRAVENOUS; SUBCUTANEOUS at 22:49

## 2022-05-15 RX ADMIN — ACETAMINOPHEN 975 MG: 325 TABLET, FILM COATED ORAL at 19:23

## 2022-05-15 RX ADMIN — DEXAMETHASONE SODIUM PHOSPHATE 10 MG: 10 INJECTION INTRAMUSCULAR; INTRAVENOUS at 13:04

## 2022-05-15 RX ADMIN — CEFAZOLIN SODIUM 1000 MG: 1 SOLUTION INTRAVENOUS at 12:46

## 2022-05-15 RX ADMIN — HEPARIN SODIUM 5000 UNITS: 5000 INJECTION INTRAVENOUS; SUBCUTANEOUS at 12:47

## 2022-05-15 NOTE — ANESTHESIA PREPROCEDURE EVALUATION
Procedure:  INCISION AND DRAINAGE (I&D) LEFT UPPER EXTREMITY (Left Arm Lower)    Relevant Problems   PULMONARY   (+) Smoking (Tobacco and marijuana)      Other   (+) Abscess of forearm, left   (+) IV drug abuse [facility])        Physical Exam    Airway    Mallampati score: I  TM Distance: >3 FB  Neck ROM: full     Dental   Comment: Poor dentition  Multiple missing and chipped  ,     Cardiovascular  Cardiovascular exam normal    Pulmonary  Pulmonary exam normal     Other Findings        Anesthesia Plan  ASA Score- 2     Anesthesia Type- general with ASA Monitors  Additional Monitors:   Airway Plan: ETT  Plan Factors-    Chart reviewed  Existing labs reviewed  Patient summary reviewed  Induction- intravenous and rapid sequence induction  Postoperative Plan- Plan for postoperative opioid use  Planned trial extubation    Informed Consent- Anesthetic plan and risks discussed with patient  I personally reviewed this patient with the CRNA  Discussed and agreed on the Anesthesia Plan with the CRNA  Blade Reilly

## 2022-05-15 NOTE — ED NOTES
Pt changed, all clothing removed, placed in hospital gown with hospital socks  Belongings secured and gone through and sent with patient to OR labeled  Medications from home taken to pharmacy   Pt taken to PACU holding at this time     Ira Garcia RN  05/15/22 1330

## 2022-05-15 NOTE — H&P
History and physical- General Surgery   David Villanueva 41 y o  male MRN: [id number]  Unit/Bed#: ED 17 Encounter: [id number]    Assessment/Plan     Assessment:  Left forearm abscess  History of IV drug abuse  Plan:  In light of the size and symptomatology from the large left forearm abscess I advised the patient to undergo incision and drainage of the abscess sinus possible  I discussed the operative procedure, risks, benefits, alternatives and possible complications, he understood and agreed to proceed  History of Present Illness    HPI:  David Villanueva is a 41 y o  male who presents to the hospital with 3 week history of increasing swelling, redness and pain from the left forearm for the past 3 weeks, he has a history of IV drug abuse, the last time he injecting cell was 4 weeks ago, subsequently developed the infection on the same side  The patient stated having issues with transportation to the hospital therefore he did not come over a week ago  Denied having any chills, fever or any other constitutional symptoms  The patient is on methadone to helping quit IV drug abuse  He was in doing any IV drugs for 4 months  Consults    Review of Systems: The rest of the review of system total of 10 were negative except for the HPI  Historical Information   History reviewed  No pertinent past medical history    Past Surgical History:   Procedure Laterality Date    SHOULDER SURGERY       Social History   Social History     Substance and Sexual Activity   Alcohol Use Never     Social History     Substance and Sexual Activity   Drug Use Yes    Frequency: 1 0 times per week    Types: Marijuana, Heroin    Comment: methadone, heroin use 5/2022     E-Cigarette/Vaping    E-Cigarette Use Never User      E-Cigarette/Vaping Substances     Social History     Tobacco Use   Smoking Status Current Every Day Smoker    Packs/day: 1 00    Types: Cigarettes   Smokeless Tobacco Never Used     Family History: non-contributory    Meds/Allergies   all current active meds have been reviewed, current meds:   Current Facility-Administered Medications   Medication Dose Route Frequency    acetaminophen (TYLENOL) tablet 975 mg  975 mg Oral Q8H PRN    ceFAZolin (ANCEF) IVPB (premix in dextrose) 1,000 mg 50 mL  1,000 mg Intravenous On Call To OR    heparin (porcine) subcutaneous injection 5,000 Units  5,000 Units Subcutaneous Q8H [address]    lactated ringers infusion  125 mL/hr Intravenous Continuous    multi-electrolyte (PLASMALYTE-A/ISOLYTE-S PH 7 4) IV solution 1,000 mL  1,000 mL Intravenous Once    Followed by   Madelyn Drop multi-electrolyte (PLASMALYTE-A/ISOLYTE-S PH 7 4) IV solution 1,000 mL  1,000 mL Intravenous Once    nicotine (NICODERM CQ) 7 mg/24hr TD 24 hr patch 1 patch  1 patch Transdermal Daily    ondansetron (ZOFRAN) injection 4 mg  4 mg Intravenous Q6H PRN    and PTA meds:   Prior to Admission Medications   Prescriptions Last Dose Informant Patient Reported?  Taking?   naproxen (NAPROSYN) 250 mg tablet   No No   Sig: Take 1 tablet (250 mg total) by mouth 2 (two) times a day with meals   triamcinolone (KENALOG) 0 1 % cream   No No   Sig: Apply topically 2 (two) times a day Both hands      Facility-Administered Medications: None     No Known Allergies    Objective   First Vitals:   Blood Pressure: 161/93 (05/15/22 1100)  Pulse: (!) 107 (05/15/22 1100)  Temperature: 98 °F (36 7 °C) (05/15/22 1102)  Respirations: 20 (05/15/22 1100)  Height: 5' 9" (175 3 cm) (05/15/22 1100)  Weight - Scale: 63 5 kg (140 lb) (05/15/22 1100)  SpO2: 99 % (05/15/22 1100)    Current Vitals:   Blood Pressure: 161/93 (05/15/22 1100)  Pulse: (!) 107 (05/15/22 1100)  Temperature: 98 °F (36 7 °C) (05/15/22 1102)  Respirations: 20 (05/15/22 1100)  Height: 5' 9" (175 3 cm) (05/15/22 1100)  Weight - Scale: 63 5 kg (140 lb) (05/15/22 1100)  SpO2: 99 % (05/15/22 1100)    No intake or output data in the 24 hours ending 05/15/22 1139    Physical Exam  Vitals and nursing note reviewed  Constitutional:       General: He is not in acute distress  Cardiovascular:      Rate and Rhythm: Normal rate and regular rhythm  Pulmonary:      Effort: No respiratory distress  Breath sounds: Normal breath sounds  Abdominal:      Palpations: Abdomen is soft  There is no mass  Tenderness: There is no abdominal tenderness  Musculoskeletal:      Comments: 15 cm abscess on the anterior aspect of the proximal left forearm, very tense, redness surrounding the area  There is no motor or sensory deficit of the left hand  Patient is not able to extend completely left elbow  He does have significant pain with extension of the left elbow  Skin:     General: Skin is warm  Coloration: Skin is not jaundiced  Findings: No erythema or rash  Neurological:      Mental Status: He is alert and oriented to person, place, and time  Cranial Nerves: No cranial nerve deficit     Psychiatric:         Mood and Affect: Mood normal          Behavior: Behavior normal

## 2022-05-15 NOTE — OP NOTE
OPERATIVE REPORT  PATIENT NAME: Amanda Gregory    :  1975  MRN: [id number]  Pt Location: [facility]OM 01    SURGERY DATE: 5/15/2022    Surgeon(s) and Role:     Jennyfer Iniguez MD - Primary    Preop Diagnosis:  Abscess of forearm, left [L02 414]    Post-Op Diagnosis Codes:     * Abscess of forearm, left [L02 414]    Procedure(s) (LRB):  INCISION AND DRAINAGE (I&D) LEFT UPPER EXTREMITY (Left)    Specimen(s):  ID Type Source Tests Collected by Time Destination   A : Left Forearm Abscess Tissue Abscess ANAEROBIC CULTURE AND GRAM STAIN, CULTURE, TISSUE AND GRAM STAIN Saji Fierro MD 5/15/2022 1255        Estimated Blood Loss:   Minimal    Drains:  [REMOVED] NG/OG/Enteral Tube Orogastric 18 Fr Center mouth (Removed)   Number of days: 0       Anesthesia Type:   IV Sedation with Anesthesia    Operative Indications:  Abscess of forearm, left [L02 414]    Operative Findings:  Large abscess in the proximal 3rd of the left forearm, cultures taken for aerobic and anaerobic  Complications:   None    Procedure and Technique:  The patient was identified in the patient was placed in the operating table in a supine position  After adequate anesthesia induction and satisfactory endotracheal intubation the left forearm was prepped and draped in sterile usual fashion with Betadine solution  An elliptical incision was made with a scalpel, taken down through the subcutaneous tissue with scalpel dissection until the abscess cavity was entered  Copious amount of purulent material under pressure was drained  Wound was copiously irrigated with saline solution  Hemostasis was accomplished with cautery around the incision  Wound was packed open with Kerlix and saline  Sterile dressing was applied  At the end of the case instrument, needles, sponges counts were correct  Patient tolerated the procedure well  I was present for the entire procedure, A qualified resident physician was not available       Patient Disposition:  PACU , hemodynamically stable and extubated and stable      SIGNATURE: Saji Fierro MD  DATE: May 15, 2022  TIME: 1:06 PM

## 2022-05-15 NOTE — PLAN OF CARE
Problem: PAIN - ADULT  Goal: Verbalizes/displays adequate comfort level or baseline comfort level  Description: Interventions:  - Encourage patient to monitor pain and request assistance  - Assess pain using appropriate pain scale  - Administer analgesics based on type and severity of pain and evaluate response  - Implement non-pharmacological measures as appropriate and evaluate response  - Consider cultural and social influences on pain and pain management  - Notify physician/advanced practitioner if interventions unsuccessful or patient reports new pain  Outcome: Progressing     Problem: INFECTION - ADULT  Goal: Absence or prevention of progression during hospitalization  Description: INTERVENTIONS:  - Assess and monitor for signs and symptoms of infection  - Monitor lab/diagnostic results  - Monitor all insertion sites, i e  indwelling lines, tubes, and drains  - Monitor endotracheal if appropriate and nasal secretions for changes in amount and color  - [place] appropriate cooling/warming therapies per order  - Administer medications as ordered  - Instruct and encourage patient and family to use good hand hygiene technique  - Identify and instruct in appropriate isolation precautions for identified infection/condition  Outcome: Progressing  Goal: Absence of fever/infection during neutropenic period  Description: INTERVENTIONS:  - Monitor WBC    Outcome: Progressing     Problem: SAFETY ADULT  Goal: Patient will remain free of falls  Description: INTERVENTIONS:  - Educate patient/family on patient safety including physical limitations  - Instruct patient to call for assistance with activity   - Consult OT/PT to assist with strengthening/mobility   - Keep Call bell within reach  - Keep bed low and locked with side rails adjusted as appropriate  - Keep care items and personal belongings within reach  - Initiate and maintain comfort rounds  - Make Fall Risk Sign visible to staff  - Offer Toileting every  Hours, in advance of need  - Initiate/Maintain alarm  - Obtain necessary fall risk management equipment:   - Apply yellow socks and bracelet for high fall risk patients  - Consider moving patient to room near nurses station  Outcome: Progressing  Goal: Maintain or return to baseline ADL function  Description: INTERVENTIONS:  -  Assess patient's ability to carry out ADLs; assess patient's baseline for ADL function and identify physical deficits which impact ability to perform ADLs (bathing, care of mouth/teeth, toileting, grooming, dressing, etc )  - Assess/evaluate cause of self-care deficits   - Assess range of motion  - Assess patient's mobility; develop plan if impaired  - Assess patient's need for assistive devices and provide as appropriate  - Encourage maximum independence but intervene and supervise when necessary  - Involve family in performance of ADLs  - Assess for home care needs following discharge   - Consider OT consult to assist with ADL evaluation and planning for discharge  - Provide patient education as appropriate  Outcome: Progressing  Goal: Maintains/Returns to pre admission functional level  Description: INTERVENTIONS:  - Perform BMAT or MOVE assessment daily    - Set and communicate daily mobility goal to care team and patient/family/caregiver  - Collaborate with rehabilitation services on mobility goals if consulted  - Perform Range of Motion  times a day  - Reposition patient every  hours    - Dangle patient  times a day  - Stand patient  times a day  - Ambulate patient  times a day  - Out of bed to chair  times a day   - Out of bed for meals  times a day  - Out of bed for toileting  - Record patient progress and toleration of activity level   Outcome: Progressing     Problem: DISCHARGE PLANNING  Goal: Discharge to home or other facility with appropriate resources  Description: INTERVENTIONS:  - Identify barriers to discharge w/patient and caregiver  - Arrange for needed discharge resources and transportation as appropriate  - Identify discharge learning needs (meds, wound care, etc )  - Arrange for interpretive services to assist at discharge as needed  - Refer to Case Management Department for coordinating discharge planning if the patient needs post-hospital services based on physician/advanced practitioner order or complex needs related to functional status, cognitive ability, or social support system  Outcome: Progressing     Problem: Knowledge Deficit  Goal: Patient/family/caregiver demonstrates understanding of disease process, treatment plan, medications, and discharge instructions  Description: Complete learning assessment and assess knowledge base    Interventions:  - Provide teaching at level of understanding  - Provide teaching via preferred learning methods  Outcome: Progressing

## 2022-05-15 NOTE — ED PROVIDER NOTES
History  Chief Complaint   Patient presents with    Abscess     L FA abscess for a few weeks, seen at urgent care last week, started on bactrim  Reports injecting heroin to area 1 mo ago  41year old male patient, last used heroin one month ago, developing abscess of the left forearm with some concern for pseudoaneurysm presents emergency department for worsening redness swelling and pain  Patient has no fever here, he is slightly tachycardic, he is currently on methadone  Bedside ultrasound was done which shows a complex abscess with some involvement of the and humeral vessels and concern for pseudoaneurysm  Explained this to the patient, full septic evaluation is done, and I have reached out to General surgery to see if this something that they would be willing handle here at this hospital if there is no pseudoaneurysm obvious on CT scan  History provided by:  Patient   used: No    Abscess  Location:  Hand  Abscess quality: fluctuance, redness and warmth    Red streaking: no    Relieved by:  Nothing  Worsened by:  Nothing  Ineffective treatments:  None tried  Associated symptoms: no fatigue, no nausea and no vomiting    Risk factors: no hx of MRSA        Prior to Admission Medications   Prescriptions Last Dose Informant Patient Reported? Taking?   ibuprofen (MOTRIN) 800 mg tablet 5/14/2022 at Unknown time  Yes Yes   Sig: Take by mouth every 6 (six) hours as needed for mild pain   naproxen (NAPROSYN) 250 mg tablet 5/15/2022 at Unknown time  No Yes   Sig: Take 1 tablet (250 mg total) by mouth 2 (two) times a day with meals   triamcinolone (KENALOG) 0 1 % cream   No No   Sig: Apply topically 2 (two) times a day Both hands      Facility-Administered Medications: None       History reviewed  No pertinent past medical history      Past Surgical History:   Procedure Laterality Date    INCISION AND DRAINAGE OF WOUND Left 5/15/2022    Procedure: INCISION AND DRAINAGE (I&D) LEFT UPPER EXTREMITY;  Surgeon: Maribell Barroso MD;  Location: [facility];  Service: General    SHOULDER SURGERY      WISDOM TOOTH EXTRACTION         History reviewed  No pertinent family history  I have reviewed and agree with the history as documented  E-Cigarette/Vaping    E-Cigarette Use Never User      E-Cigarette/Vaping Substances     Social History     Tobacco Use    Smoking status: Current Every Day Smoker     Packs/day: 1 00     Types: Cigarettes    Smokeless tobacco: Never Used   Vaping Use    Vaping Use: Never used   Substance Use Topics    Alcohol use: Never    Drug use: Yes     Frequency: 1 0 times per week     Types: Marijuana, Heroin     Comment: methadone, heroin use 5/2022       Review of Systems   Constitutional: Negative for fatigue  Gastrointestinal: Negative for nausea and vomiting  All other systems reviewed and are negative  Physical Exam  Physical Exam  Vitals and nursing note reviewed  Constitutional:       General: He is not in acute distress  Appearance: He is well-developed  He is not diaphoretic  HENT:      Head: Normocephalic and atraumatic  Right Ear: External ear normal       Left Ear: External ear normal    Eyes:      General: No scleral icterus  Right eye: No discharge  Left eye: No discharge  Conjunctiva/sclera: Conjunctivae normal    Neck:      Thyroid: No thyromegaly  Vascular: No JVD  Trachea: No tracheal deviation  Cardiovascular:      Rate and Rhythm: Normal rate and regular rhythm  Pulmonary:      Effort: Pulmonary effort is normal  No respiratory distress  Breath sounds: Normal breath sounds  No stridor  No wheezing or rales  Abdominal:      General: Bowel sounds are normal  There is no distension  Palpations: Abdomen is soft  Tenderness: There is no abdominal tenderness  Musculoskeletal:         General: No tenderness or deformity  Normal range of motion          Arms:       Cervical back: Normal range of motion and neck supple  Skin:     General: Skin is warm and dry  Neurological:      Mental Status: He is alert and oriented to person, place, and time  Cranial Nerves: No cranial nerve deficit        Coordination: Coordination normal    Psychiatric:         Behavior: Behavior normal          Vital Signs  ED Triage Vitals   Temperature Pulse Respirations Blood Pressure SpO2   05/15/22 1102 05/15/22 1100 05/15/22 1100 05/15/22 1100 05/15/22 1100   98 °F (36 7 °C) (!) 107 20 161/93 99 %      Temp Source Heart Rate Source Patient Position - Orthostatic VS BP Location FiO2 (%)   05/15/22 1224 05/15/22 1224 05/15/22 1547 05/15/22 1547 --   Temporal Monitor Lying Right arm       Pain Score       05/15/22 1100       4           Vitals:    05/15/22 1440 05/15/22 1547 05/15/22 2233 05/16/22 0730   BP: 119/81 125/82 120/68 111/67   Pulse: 80 82 69 73   Patient Position - Orthostatic VS:  Lying Lying          Visual Acuity      ED Medications  Medications   ondansetron (ZOFRAN) injection 4 mg (4 mg Intravenous Given 5/16/22 0318)   nicotine (NICODERM CQ) 7 mg/24hr TD 24 hr patch 1 patch (1 patch Transdermal Medication Applied 5/16/22 0854)   heparin (porcine) subcutaneous injection 5,000 Units (5,000 Units Subcutaneous Given 5/16/22 1442)   ceFAZolin (ANCEF) IVPB (premix in dextrose) 1,000 mg 50 mL (1,000 mg Intravenous Not Given 5/15/22 1425)   acetaminophen (TYLENOL) tablet 975 mg (975 mg Oral Given 5/15/22 1923)   vancomycin (VANCOCIN) IVPB (premix in dextrose) 1,000 mg 200 mL (1,000 mg Intravenous New Bag 5/16/22 1441)   ceFAZolin (ANCEF) IVPB (premix in dextrose) 1,000 mg 50 mL (1,000 mg Intravenous Not Given 5/15/22 1420)   multi-electrolyte (PLASMALYTE-A/ISOLYTE-S PH 7 4) IV solution 1,000 mL (1,000 mL Intravenous New Bag 5/15/22 1158)     Followed by   multi-electrolyte (PLASMALYTE-A/ISOLYTE-S PH 7 4) IV solution 1,000 mL (1,000 mL Intravenous New Bag 5/15/22 1201)   methadone (DOLOPHINE) tablet 120 mg (120 mg Oral Given 5/16/22 1237)       Diagnostic Studies  Results Reviewed     Procedure Component Value Units Date/Time    Blood culture [[id number]] Collected: 05/15/22 1136    Lab Status: Preliminary result Specimen: Blood from Arm, Right Updated: 05/16/22 0806     Blood Culture Received in Microbiology Lab  Culture in Progress  Blood culture [[id number]] Collected: 05/15/22 1136    Lab Status: Preliminary result Specimen: Blood from Arm, Right Updated: 05/16/22 0806     Blood Culture Received in Microbiology Lab  Culture in Progress  CBC and differential [[id number]]  (Abnormal) Collected: 05/16/22 0528    Lab Status: Final result Specimen: Blood from Hand, Right Updated: 05/16/22 0544     WBC 12 72 Thousand/uL      RBC 4 18 Million/uL      Hemoglobin 12 4 g/dL      Hematocrit 37 1 %      MCV 89 fL      MCH 29 7 pg      MCHC 33 4 g/dL      RDW 13 1 %      MPV 8 6 fL      Platelets 404 Thousands/uL      nRBC 0 /100 WBCs      Neutrophils Relative 76 %      Immat GRANS % 1 %      Lymphocytes Relative 17 %      Monocytes Relative 6 %      Eosinophils Relative 0 %      Basophils Relative 0 %      Neutrophils Absolute 9 62 Thousands/µL      Immature Grans Absolute 0 08 Thousand/uL      Lymphocytes Absolute 2 16 Thousands/µL      Monocytes Absolute 0 82 Thousand/µL      Eosinophils Absolute 0 02 Thousand/µL      Basophils Absolute 0 02 Thousands/µL     HS Troponin I 4hr [[id number]]  (Normal) Collected: 05/15/22 1612    Lab Status: Final result Specimen: Blood from Arm, Right Updated: 05/15/22 1642     hs TnI 4hr <2 ng/L      Delta 4hr hsTnI <0 ng/L     Lactate Blood [[id number]]  (Normal) Collected: 05/15/22 1612    Lab Status: Final result Specimen: Blood from Arm, Right Updated: 05/15/22 1637     LACTIC ACID 0 5 mmol/L     Narrative:      Result may be elevated if tourniquet was used during collection      Procalcitonin [[id number]]  (Normal) Collected: 05/15/22 1136    Lab Status: Final result Specimen: Blood from Arm, Right Updated: 05/15/22 1220     Procalcitonin <0 05 ng/ml     B-type natriuretic peptide [[id number]]  (Normal) Collected: 05/15/22 1136    Lab Status: Final result Specimen: Blood from Arm, Right Updated: 05/15/22 1217     NT-proBNP 32 pg/mL     Lipase [[id number]]  (Abnormal) Collected: 05/15/22 1136    Lab Status: Final result Specimen: Blood from Arm, Right Updated: 05/15/22 1217     Lipase 38 u/L     Magnesium [[id number]]  (Normal) Collected: 05/15/22 1136    Lab Status: Final result Specimen: Blood from Arm, Right Updated: 05/15/22 1217     Magnesium 1 9 mg/dL     HS Troponin 0hr (reflex protocol) [[id number]]  (Normal) Collected: 05/15/22 1136    Lab Status: Final result Specimen: Blood from Arm, Right Updated: 05/15/22 1217     hs TnI 0hr 2 ng/L     HS Troponin I 2hr [[id number]]     Lab Status: No result Specimen: Blood     Protime-INR [[id number]]  (Normal) Collected: 05/15/22 1136    Lab Status: Final result Specimen: Blood from Arm, Right Updated: 05/15/22 1213     Protime 13 5 seconds      INR 1 08    APTT [[id number]]  (Normal) Collected: 05/15/22 1136    Lab Status: Final result Specimen: Blood from Arm, Right Updated: 05/15/22 1213     PTT 30 seconds     Lactate Blood [[id number]]  (Normal) Collected: 05/15/22 1136    Lab Status: Final result Specimen: Blood from Arm, Right Updated: 05/15/22 1212     LACTIC ACID 1 8 mmol/L     Narrative:      Result may be elevated if tourniquet was used during collection      Comprehensive metabolic panel [[id number]]  (Abnormal) Collected: 05/15/22 1136    Lab Status: Final result Specimen: Blood from Arm, Right Updated: 05/15/22 1210     Sodium 133 mmol/L      Potassium 4 1 mmol/L      Chloride 95 mmol/L      CO2 29 mmol/L      ANION GAP 9 mmol/L      BUN 21 mg/dL      Creatinine 0 99 mg/dL      Glucose 130 mg/dL      Calcium 8 9 mg/dL      AST 10 U/L      ALT 17 U/L      Alkaline Phosphatase 102 U/L      Total Protein 7 8 g/dL      Albumin 3 5 g/dL      Total Bilirubin 0 36 mg/dL      eGFR 91 ml/min/1 73sq m     Narrative:      National Kid[employer] guidelines for Chronic Kidney Disease (CKD):     Stage 1 with normal or high GFR (GFR > 90 mL/min/1 73 square meters)    Stage 2 Mild CKD (GFR = 60-89 mL/min/1 73 square meters)    Stage 3A Moderate CKD (GFR = 45-59 mL/min/1 73 square meters)    Stage 3B Moderate CKD (GFR = 30-44 mL/min/1 73 square meters)    Stage 4 Severe CKD (GFR = 15-29 mL/min/1 73 square meters)    Stage 5 End Stage CKD (GFR <15 mL/min/1 73 square meters)  Note: GFR calculation is accurate only with a steady state creatinine    Blood gas, venous [[id number]]  (Abnormal) Collected: 05/15/22 1136    Lab Status: Final result Specimen: Blood from Arm, Right Updated: 05/15/22 1147     pH, Raza 7 335     pCO2, Raza 53 8 mm Hg      pO2, Raza 26 7 mm Hg      HCO3, Raza 28 0 mmol/L      Base Excess, Raza 1 1 mmol/L      O2 Content, Raza 9 4 ml/dL      O2 HGB, VENOUS 47 0 %     CBC and differential [[id number]]  (Abnormal) Collected: 05/15/22 1136    Lab Status: Final result Specimen: Blood from Arm, Right Updated: 05/15/22 1144     WBC 18 63 Thousand/uL      RBC 4 55 Million/uL      Hemoglobin 13 6 g/dL      Hematocrit 41 1 %      MCV 90 fL      MCH 29 9 pg      MCHC 33 1 g/dL      RDW 13 1 %      MPV 8 5 fL      Platelets 677 Thousands/uL      nRBC 0 /100 WBCs      Neutrophils Relative 81 %      Immat GRANS % 1 %      Lymphocytes Relative 10 %      Monocytes Relative 8 %      Eosinophils Relative 0 %      Basophils Relative 0 %      Neutrophils Absolute 15 09 Thousands/µL      Immature Grans Absolute 0 13 Thousand/uL      Lymphocytes Absolute 1 88 Thousands/µL      Monocytes Absolute 1 40 Thousand/µL      Eosinophils Absolute 0 08 Thousand/µL      Basophils Absolute 0 05 Thousands/µL                  CT upper extremity wo contrast left   Final Result by Lizabeth Cruz MD (05/15 3930)      Large fluid collection which appears to arise along the anterior aspect of the flexor carpi radialis muscle diving deep in between the flexor carpi radialis muscle and brachial radialis muscle to extend and involve the supinator muscle  This fluid    collection also extends laterally to lie anterior to the to the brachioradialis muscle  The findings are suspicious for intramuscular and subcutaneous abscess with overlying cellulitis  No soft tissue gas  No acute osseous abnormality  Workstation performed: [id number]         XR chest 2 views   Final Result by Elder Vernon MD (05/15 1432)      No acute cardiopulmonary disease  Workstation performed: [id number]                    Procedures  Procedures         ED Course                                             MDM  Number of Diagnoses or Management Options  Abscess: new and requires workup     Amount and/or Complexity of Data Reviewed  Clinical lab tests: ordered and reviewed  Tests in the radiology section of CPT®: reviewed and ordered    Patient Progress  Patient progress: stable      Disposition  Final diagnoses:   Abscess     Time reflects when diagnosis was documented in both MDM as applicable and the Disposition within this note     Time User Action Codes Description Comment    5/15/2022 11:32 AM Darvin Ariza Add [L02 91] Abscess     5/15/2022 11:44 AM Dagmar Hilliard Add [L02 414] Abscess of forearm, left     5/15/2022 12:56 PM Greg Kuhn Modify [L02 414] Abscess of forearm, left       ED Disposition     ED Disposition   Send to OR    Condition   --    Date/Time   Sun May 15, 2022 11:32 AM    Comment   Case was discussed with Dr Celsa Crystal and the patient's admission status was agreed to be Admission Status: observation status to the service of Dr Celsa Crystal              Follow-up Information    None         Current Discharge Medication List      CONTINUE these medications which have NOT CHANGED    Details   ibuprofen (MOTRIN) 800 mg tablet Take by mouth every 6 (six) hours as needed for mild pain      naproxen (NAPROSYN) 250 mg tablet Take 1 tablet (250 mg total) by mouth 2 (two) times a day with meals  Qty: 20 tablet, Refills: 0    Associated Diagnoses: Cellulitis and abscess of right leg      triamcinolone (KENALOG) 0 1 % cream Apply topically 2 (two) times a day Both hands  Qty: 30 g, Refills: 0    Associated Diagnoses: Chronic dermatitis             No discharge procedures on file      PDMP Review     None          ED Provider  Electronically Signed by           Maciel Olson DO  05/16/22 8285

## 2022-05-16 VITALS
HEIGHT: 69 IN | DIASTOLIC BLOOD PRESSURE: 73 MMHG | HEART RATE: 69 BPM | SYSTOLIC BLOOD PRESSURE: 119 MMHG | BODY MASS INDEX: 20.73 KG/M2 | WEIGHT: 140 LBS | TEMPERATURE: 97.9 F | OXYGEN SATURATION: 95 % | RESPIRATION RATE: 18 BRPM

## 2022-05-16 LAB
BASOPHILS # BLD AUTO: 0.02 THOUSANDS/ΜL (ref 0–0.1)
BASOPHILS NFR BLD AUTO: 0 % (ref 0–1)
EOSINOPHIL # BLD AUTO: 0.02 THOUSAND/ΜL (ref 0–0.61)
EOSINOPHIL NFR BLD AUTO: 0 % (ref 0–6)
ERYTHROCYTE [DISTWIDTH] IN BLOOD BY AUTOMATED COUNT: 13.1 % (ref 11.6–15.1)
HCT VFR BLD AUTO: 37.1 % (ref 36.5–49.3)
HGB BLD-MCNC: 12.4 G/DL (ref 12–17)
IMM GRANULOCYTES # BLD AUTO: 0.08 THOUSAND/UL (ref 0–0.2)
IMM GRANULOCYTES NFR BLD AUTO: 1 % (ref 0–2)
LYMPHOCYTES # BLD AUTO: 2.16 THOUSANDS/ΜL (ref 0.6–4.47)
LYMPHOCYTES NFR BLD AUTO: 17 % (ref 14–44)
MCH RBC QN AUTO: 29.7 PG (ref 26.8–34.3)
MCHC RBC AUTO-ENTMCNC: 33.4 G/DL (ref 31.4–37.4)
MCV RBC AUTO: 89 FL (ref 82–98)
MONOCYTES # BLD AUTO: 0.82 THOUSAND/ΜL (ref 0.17–1.22)
MONOCYTES NFR BLD AUTO: 6 % (ref 4–12)
NEUTROPHILS # BLD AUTO: 9.62 THOUSANDS/ΜL (ref 1.85–7.62)
NEUTS SEG NFR BLD AUTO: 76 % (ref 43–75)
NRBC BLD AUTO-RTO: 0 /100 WBCS
PLATELET # BLD AUTO: 466 THOUSANDS/UL (ref 149–390)
PMV BLD AUTO: 8.6 FL (ref 8.9–12.7)
RBC # BLD AUTO: 4.18 MILLION/UL (ref 3.88–5.62)
WBC # BLD AUTO: 12.72 THOUSAND/UL (ref 4.31–10.16)

## 2022-05-16 PROCEDURE — 85025 COMPLETE CBC W/AUTO DIFF WBC: CPT | Performed by: SURGERY

## 2022-05-16 PROCEDURE — 99024 POSTOP FOLLOW-UP VISIT: CPT | Performed by: SURGERY

## 2022-05-16 RX ORDER — AMOXICILLIN AND CLAVULANATE POTASSIUM 875; 125 MG/1; MG/1
1 TABLET, FILM COATED ORAL EVERY 12 HOURS SCHEDULED
Qty: 18 TABLET | Refills: 0 | Status: SHIPPED | OUTPATIENT
Start: 2022-05-16 | End: 2022-05-25

## 2022-05-16 RX ORDER — METHADONE HYDROCHLORIDE 10 MG/1
120 TABLET ORAL ONCE
Status: COMPLETED | OUTPATIENT
Start: 2022-05-16 | End: 2022-05-16

## 2022-05-16 RX ADMIN — NICOTINE 1 PATCH: 7 PATCH, EXTENDED RELEASE TRANSDERMAL at 08:54

## 2022-05-16 RX ADMIN — HEPARIN SODIUM 5000 UNITS: 5000 INJECTION INTRAVENOUS; SUBCUTANEOUS at 05:44

## 2022-05-16 RX ADMIN — VANCOMYCIN HYDROCHLORIDE 1000 MG: 1 INJECTION, SOLUTION INTRAVENOUS at 14:41

## 2022-05-16 RX ADMIN — METHADONE HYDROCHLORIDE 120 MG: 10 TABLET ORAL at 12:37

## 2022-05-16 RX ADMIN — VANCOMYCIN HYDROCHLORIDE 1000 MG: 1 INJECTION, SOLUTION INTRAVENOUS at 02:51

## 2022-05-16 RX ADMIN — HEPARIN SODIUM 5000 UNITS: 5000 INJECTION INTRAVENOUS; SUBCUTANEOUS at 14:42

## 2022-05-16 RX ADMIN — NICOTINE 1 PATCH: 7 PATCH, EXTENDED RELEASE TRANSDERMAL at 03:21

## 2022-05-16 RX ADMIN — ONDANSETRON 4 MG: 2 INJECTION INTRAMUSCULAR; INTRAVENOUS at 03:18

## 2022-05-16 NOTE — PROGRESS NOTES
Progress Note - General Surgery   Sondra Stout 41 y o  male MRN: [id number]  Unit/Bed#: -01 Encounter: [id number]    Assessment/Plan  POD 1 I & D left arm abscess   Opioid Abuse Disorder, on Methadone  GPC on gram stain, sensitivities to follow as outpatient  -Methadone 120 mg, x 1 dose  Confirmed with [facility] in [facility], [phone] Nurse Practitioner of the Clinic   -instructions to remove packing tomorrow, rinse with shower water, pat dry and cover with DSD daily  -script for Augmentin for outpatient   -no script for pain meds  Discharge today  Chief Complaint: I feel great  I need my dose of methadone  The clinic closes at 11 am      Discussed with patient that he will get one dose of Methadone 120 mg today, the clinic was called and dose was verified  He goes daily and and has been compliant  He will take the packing out tomorrow morning in the shower, rinse it out and cover it with dry sterile dressings daily  He will go home on Augmentin  Objective/Exam: Blood pressure 111/67, pulse 73, temperature 98 4 °F (36 9 °C), resp  rate 14, height 5' 9" (1 753 m), weight 63 5 kg (140 lb), SpO2 94 %  Wound Culure: No results found for: WOUNDCULT      General Appearance:    Alert and orientated x 3, cooperative, no distress   Lungs:     Clear to auscultation bilaterally, respirations unlabored    Heart:    Regular rate and rhythm   Abdomen:     Soft nt   Left arm with post op dressings c/d/i            Extremities:   Extremities normal,  no cyanosis or edema   Pulses:   2+ and symmetric all extremities, no calf tenderness   Skin:   Skin color, texture, turgor normal, no rashes or lesions   Neurologic:   CNII-XII intact, normal strength, sensation and reflexes     Throughout, affect appropriate       ,      Intake/Output Summary (Last 24 hours) at 5/16/2022 1050  Last data filed at 5/16/2022 0900  Gross per 24 hour   Intake 1160 ml   Output 1275 ml   Net -115 ml Invasive Devices  Report    Peripheral Intravenous Line  Duration           Peripheral IV 05/15/22 Proximal;Right;Ventral (anterior) Forearm <1 day                                        Labs: CBC with diff: @RESUFAST(WBC,HGB,HCT,MCV,PLT,ADJUSTEDWBC,   RBC,MCH,MCHC,RDW,MPV,NRBC,TOTALCELLSCOUNTED,SEGS%,GRANS%,LYMPHS%,EOS%,BASO%,ABNEUT,ABGRANS,ABLYMPHS,ABMOMOS,ABEOS,ABBASO)@,   BMP/CMP:  Lab Results   Component Value Date    K 4 1 05/15/2022    CL 95 (L) 05/15/2022    CO2 29 05/15/2022    BUN 21 05/15/2022    CREATININE 0 99 05/15/2022    CALCIUM 8 9 05/15/2022    AST 10 05/15/2022    ALT 17 05/15/2022    ALKPHOS 102 05/15/2022    EGFR 91 05/15/2022   ,   Lipid Panel: No results found for: CHOL,   Coags:   Lab Results   Component Value Date    PTT 30 05/15/2022    INR 1 08 05/15/2022   ,     Blood Culture:   Lab Results   Component Value Date    BLOODCX Received in Microbiology Lab  Culture in Progress  05/15/2022    BLOODCX Received in Microbiology Lab  Culture in Progress  05/15/2022   ,   Urinalysis: No results found for: [place], SPECGRAV, PHUR, LEUKOCYTESUR, NITRITE, PROTEINUA, GLUCOSEU, KETONESU, BILIRUBINUR, BLOODU,   Urine Culture: No results found for: URINECX,         Imaging: XR chest 2 views    Result Date: 5/15/2022  Impression: No acute cardiopulmonary disease  Workstation performed: [id number]     CT upper extremity wo contrast left    Result Date: 5/15/2022  Impression: Large fluid collection which appears to arise along the anterior aspect of the flexor carpi radialis muscle diving deep in between the flexor carpi radialis muscle and brachial radialis muscle to extend and involve the supinator muscle  This fluid collection also extends laterally to lie anterior to the to the brachioradialis muscle  The findings are suspicious for intramuscular and subcutaneous abscess with overlying cellulitis  No soft tissue gas  No acute osseous abnormality   Workstation performed: [id number] Mara Dobbins PA-C  5/16/2022

## 2022-05-16 NOTE — UTILIZATION REVIEW
Inpatient Admission Authorization Request   NOTIFICATION OF INPATIENT ADMISSION/INPATIENT AUTHORIZATION REQUEST   SERVICING FACILITY:   [address] [place] [address]  Tax ID: [id number]  NPI: [id number]  Place of Service: Inpatient [facility]  [address]  Place of Service Code: 24     ATTENDING PROVIDER:  Attending Name and NPI#: Isa Siemens, [place] [[id number]]  Address: [address] [place] [address]  Phone: [phone]     UTILIZATION REVIEW CONTACT:  Auid Marte Utilization   Network Utilization Review Department  Phone: [phone]  Fax [phone]  Email: [email or web address]     PHYSICIAN ADVISORY SERVICES:  FOR IDHP-JO-MVTW REVIEW - MEDICAL NECESSITY DENIAL  Phone: [phone]  Fax: [phone]  Email: [email or web address]     TYPE OF REQUEST:  Inpatient Status     ADMISSION INFORMATION:  ADMISSION DATE/TIME: 5/15/22 11:38 AM  PATIENT DIAGNOSIS CODE/DESCRIPTION:  Abscess [L02 91]  Abscess of forearm, left [L02 414]  DISCHARGE DATE/TIME: No discharge date for patient encounter  IMPORTANT INFORMATION:  Please contact the [facility] directly with any questions or concerns regarding this request  Department voicemails are confidential     Send requests for admission clinical reviews, concurrent reviews, approvals, and administrative denials due to lack of clinical to fax [phone]

## 2022-05-16 NOTE — PLAN OF CARE
Problem: INFECTION - ADULT  Goal: Absence of fever/infection during neutropenic period  Description: INTERVENTIONS:  - Monitor WBC    Outcome: Progressing     Problem: INFECTION - ADULT  Goal: Absence or prevention of progression during hospitalization  Description: INTERVENTIONS:  - Assess and monitor for signs and symptoms of infection  - Monitor lab/diagnostic results  - Monitor all insertion sites, i e  indwelling lines, tubes, and drains  - Monitor endotracheal if appropriate and nasal secretions for changes in amount and color  - [place] appropriate cooling/warming therapies per order  - Administer medications as ordered  - Instruct and encourage patient and family to use good hand hygiene technique  - Identify and instruct in appropriate isolation precautions for identified infection/condition  Outcome: Progressing

## 2022-05-16 NOTE — UTILIZATION REVIEW
Initial Clinical Review    Admission: Date/Time/Statement:   Admission Orders (From admission, onward)     Ordered        05/15/22 1138  Inpatient Admission  Once                      Orders Placed This Encounter   Procedures    Inpatient Admission     Standing Status:   Standing     Number of Occurrences:   1     Order Specific Question:   Level of Care     Answer:   Med Surg [16]     Order Specific Question:   Estimated length of stay     Answer:   More than 2 Midnights     Order Specific Question:   Certification     Answer:   I certify that inpatient services are medically necessary for this patient for a duration of greater than two midnights  See H&P and MD Progress Notes for additional information about the patient's course of treatment  ED Arrival Information     Expected   -    Arrival   5/15/2022 09:48    Acuity   Urgent            Means of arrival   Walk-In    Escorted by   Self    Service   Surgery-General    Admission type   Urgent            Arrival complaint   ABSCESS           Chief Complaint   Patient presents with    Abscess     L FA abscess for a few weeks, seen at urgent care last week, started on bactrim  Reports injecting heroin to area 1 mo ago  Initial Presentation: 41 y o  male to ED from home w/ inc swelling redness and pain L FA for the past 3 weeks   Hx of IV drug abuse  Last time injecting was 4 weeks ago and developed infection on the same side  On methadone to help w/ quitting   Admitted IP status w/ L FA abscess   Plan for I&D     PE:  cm abscess on the anterior aspect of the proximal left forearm, very tense, redness surrounding the area  5/15 OP Note   INCISION AND DRAINAGE (I&D) LEFT UPPER EXTREMITY   Operative Findings:  Large abscess in the proximal 3rd of the left forearm, cultures taken for aerobic and anaerobic    Date: 5/16   Day 2: methadone confirmed  No results from wound cultures    DC to home     ED Triage Vitals   Temperature Pulse Respirations Blood Pressure SpO2   05/15/22 1102 05/15/22 1100 05/15/22 1100 05/15/22 1100 05/15/22 1100   98 °F (36 7 °C) (!) 107 20 161/93 99 %      Temp Source Heart Rate Source Patient Position - Orthostatic VS BP Location FiO2 (%)   05/15/22 1224 05/15/22 1224 05/15/22 1547 05/15/22 1547 --   Temporal Monitor Lying Right arm       Pain Score       05/15/22 1100       4          Wt Readings from Last 1 Encounters:   05/15/22 63 5 kg (140 lb)     Additional Vital Signs:   05/16/22 07:30:40 98 4 °F (36 9 °C) 73 14 111/67 82 94 % -- -- -- --   05/15/22 22:33:27 97 9 °F (36 6 °C) 69 18 120/68 85 95 % -- -- -- Lying   05/15/22 2017 -- -- -- -- -- -- -- None (Room air) -- --   05/15/22 15:47:27 98 4 °F (36 9 °C) 82 17 125/82 96 95 % -- -- -- Lying   05/15/22 1500 -- -- -- -- -- -- -- None (Room air) -- --   05/15/22 14:40:40 98 5 °F (36 9 °C) 80 16 119/81 94 96 % -- -- -- --   05/15/22 14:36:51 -- 89 -- 119/81 94 94 % -- -- -- --   05/15/22 1350 -- -- -- -- -- 96 % -- -- -- --   05/15/22 1345 98 2 °F (36 8 °C) 102 17 126/91 105 96 % -- -- WDL --   05/15/22 1330 97 7 °F (36 5 °C) 105 18 120/88 104 96 % -- None (Room air) WDL --   05/15/22 1318 97 9 °F (36 6 °C) 103 17 123/87 101 99 % 4 L/min Simple mask WDL --   05/15/22 1315 97 9 °F (36 6 °C) 103 17 123/87 101 99 % 4 L/min Simple mask WDL --   05/15/22 1224 98 4 °F (36 9 °C) 82 19 124/76 -- 97 % -- None (Room air) -- --   05/15/22 1102 98 °F (36 7 °C) -- -- -- --            Pertinent Labs/Diagnostic Test Results:   5/15 EKG Normal sinus rhythm  Moderate voltage criteria for LVH, may be normal variant  Borderline ECG  No previous ECGs available  CT upper extremity wo contrast left   Final Result by Di Moser MD (05/15 4090)      Large fluid collection which appears to arise along the anterior aspect of the flexor carpi radialis muscle diving deep in between the flexor carpi radialis muscle and brachial radialis muscle to extend and involve the supinator muscle    This fluid collection also extends laterally to lie anterior to the to the brachioradialis muscle  The findings are suspicious for intramuscular and subcutaneous abscess with overlying cellulitis  No soft tissue gas  No acute osseous abnormality  Workstation performed: [id number]         XR chest 2 views   Final Result by Jay Lynch MD (05/15 1432)      No acute cardiopulmonary disease                    Workstation performed: [id number]           Results from last 7 days   Lab Units 05/16/22  0528 05/15/22  1136   WBC Thousand/uL 12 72* 18 63*   HEMOGLOBIN g/dL 12 4 13 6   HEMATOCRIT % 37 1 41 1   PLATELETS Thousands/uL 466* 540*   NEUTROS ABS Thousands/µL 9 62* 15 09*     Results from last 7 days   Lab Units 05/15/22  1136   SODIUM mmol/L 133*   POTASSIUM mmol/L 4 1   CHLORIDE mmol/L 95*   CO2 mmol/L 29   ANION GAP mmol/L 9   BUN mg/dL 21   CREATININE mg/dL 0 99   EGFR ml/min/1 73sq m 91   CALCIUM mg/dL 8 9   MAGNESIUM mg/dL 1 9     Results from last 7 days   Lab Units 05/15/22  1136   AST U/L 10   ALT U/L 17   ALK PHOS U/L 102   TOTAL PROTEIN g/dL 7 8   ALBUMIN g/dL 3 5   TOTAL BILIRUBIN mg/dL 0 36     Results from last 7 days   Lab Units 05/15/22  1136   GLUCOSE RANDOM mg/dL 130     Results from last 7 days   Lab Units 05/15/22  1136   PH DESTINY  7 335   PCO2 DESTINY mm Hg 53 8*   PO2 DESTINY mm Hg 26 7*   HCO3 DESTINY mmol/L 28 0   BASE EXC DESTINY mmol/L 1 1   O2 CONTENT DESTINY ml/dL 9 4   O2 HGB, VENOUS % 47 0*     Results from last 7 days   Lab Units 05/15/22  1612 05/15/22  1136   HS TNI 0HR ng/L  --  2   HS TNI 4HR ng/L <2  --    HSTNI D4 ng/L <0  --      Results from last 7 days   Lab Units 05/15/22  1136   PROTIME seconds 13 5   INR  1 08   PTT seconds 30     Results from last 7 days   Lab Units 05/15/22  1136   PROCALCITONIN ng/ml <0 05     Results from last 7 days   Lab Units 05/15/22  1612 05/15/22  1136   LACTIC ACID mmol/L 0 5 1 8     Results from last 7 days   Lab Units 05/15/22  1136   NT-PRO BNP pg/mL 32 Results from last 7 days   Lab Units 05/15/22  1136   LIPASE u/L 38*       Results from last 7 days   Lab Units 05/15/22  1136   BLOOD CULTURE  Received in Microbiology Lab  Culture in Progress  Received in Microbiology Lab  Culture in Progress  ED Treatment:   Medication Administration from 05/15/2022 0948 to 05/15/2022 1224       Date/Time Order Dose Route Action     05/15/2022 1158 multi-electrolyte (PLASMALYTE-A/ISOLYTE-S PH 7 4) IV solution 1,000 mL 1,000 mL Intravenous New Bag     05/15/2022 1201 multi-electrolyte (PLASMALYTE-A/ISOLYTE-S PH 7 4) IV solution 1,000 mL 1,000 mL Intravenous New Bag        History reviewed  No pertinent past medical history  Present on Admission:   IV drug abuse ([facility] [address] )   Abscess of forearm, left      Admitting Diagnosis: Abscess [L02 91]  Abscess of forearm, left [L02 414]  Age/Sex: 41 y o  male  Admission Orders:  Scheduled Medications:  heparin (porcine), 5,000 Units, Subcutaneous, Q8H [address]  nicotine, 1 patch, Transdermal, Daily  vancomycin, 15 mg/kg, Intravenous, Q12H      Continuous IV Infusions:     PRN Meds:  acetaminophen, 975 mg, Oral, Q8H PRN  ondansetron, 4 mg, Intravenous, Q6H PRN    NPO to reg diet  I&O   Up as martin         Network Utilization Review Department  ATTENTION: Please call with any questions or concerns to [phone] and carefully listen to the prompts so that you are directed to the right person  All voicemails are confidential   Radha Benitez all requests for admission clinical reviews, approved or denied determinations and any other requests to dedicated fax number below belonging to the campus where the patient is receiving treatment   List of dedicated fax numbers for the Facilities:  [address] [facility] [address] DENIALS (Administrative/Medical Necessity) [phone]   1000  [facility] (Maternity/NICU/Pediatrics) [phone] [address] [phone]   [address] [phone]     [address] [place] [address] [address] [place] [address] [address] [place] [address] [phone]

## 2022-05-16 NOTE — DISCHARGE INSTRUCTIONS
Call the surgery office for appointment in 2 weeks  Take the antibiotic until it is finished  Get in the shower tomorrow morning get the arm wet and dressing wet  Remove the packing out  Let the shower water run over the open wound  Pat it dry and cover with with dry sterile dressings  This needs to be done everyday  Call the office if you have increased pain, fever chills, increased pain  Incision and drainage discharge instructions:     Shower daily, you can get the wound wet  Let shower soap run over the incision, rinse out and then pat dry  Apply dry sterile dressing after shower  If dressing saturates then change the dressing  Continue the antibiotics until they are finished  Wash sheets, towels and clothing often  Call the office if you have increase pain, pus, redness of the wound  Call the office if you develop fever, chills, nausea, vomiting, diarrhea  You can take tylenol 650 mg or ibuprofen (up to  800 mg) for moderate to severe pain  If you have pain that is unrelieved with Tylenol and Ibuprofen, then take the prescription pain medication   Take colace 100 mg twice daily to avoid constipation

## 2022-05-16 NOTE — CASE MANAGEMENT
Case Management Assessment & Discharge Planning Note    Patient name Susan Chadwick  Location /-07 MRN [id number]  : 1975 Date 2022       Current Admission Date: 5/15/2022  Current Admission Diagnosis:Abscess of forearm, left   Patient Active Problem List    Diagnosis Date Noted    IV drug abuse ([address] ) 05/15/2022    Abscess of forearm, left 05/15/2022    Smoking 05/15/2022      LOS (days): 1  Geometric Mean LOS (GMLOS) (days):   Days to GMLOS:     OBJECTIVE:    Risk of Unplanned Readmission Score: 8 47         Current admission status: Inpatient       Preferred Pharmacy:   [facility] [facility] [address]  Phone: [phone] Fax: [phone]    Primary Care Provider: No primary care provider on file  Primary Insurance: [facility]  Secondary Insurance:     ASSESSMENT:  Active Health Care Proxies    There are no active Health Care Proxies on file  Readmission Root Cause  30 Day Readmission: No    Patient Information  Admitted from[de-identified] Home  Mental Status: Alert  During Assessment patient was accompanied by: Not accompanied during assessment  Assessment information provided by[de-identified] Patient  Primary Caregiver: Self  Support Systems: [place] of Residence: [place] [address] do you live in?: [address] entry access options   Select all that apply : Stairs  Number of steps to enter home : 3  Type of Current Residence: [facility]  Living Arrangements: Other (Comment) (ex gf, ex gf's sister and her children)    Activities of Daily Living Prior to Admission  Functional Status: Independent  Completes ADLs independently?: Yes  Ambulates independently?: Yes  Does patient use assisted devices?: No  Does patient currently own DME?: No  Does patient have a history of Outpatient Therapy (PT/OT)?: No  Does the patient have a history of Short-Term Rehab?: No  Does patient have a history of HHC?: No Patient Information Continued  Does patient have prescription coverage?: Yes  Does patient receive dialysis treatments?: No  Does patient have a history of substance abuse?: Yes  Historical substance use preference: Alcohol/ETOH, Heroin  Is patient currently in treatment for substance abuse?: Yes (Methadone maintenance at [facility]  Patient has a therapist/care manager and attends meetings  He feels very supported in his sobriety  Sober from ETOH)  Does patient have a history of Mental Health Diagnosis?: Yes (patient reports he hasn't been formally diagnosed but feels he has PTSD after witnessing his friend pass away at work last year  He reports he performed CPR for 20 minutes but wasn't able to revive him )  Is patient receiving treatment for mental health?: No  Patient declined treatment information  (He reports that he talks to his substance use counselor and feels supported)  Has patient received inpatient treatment related to mental health in the last 2 years?: No     Means of Transportation  Means of Transport to Appts[de-identified] Friends  In the past 12 months, has lack of transportation kept you from medical appointments or from getting medications?: Yes  In the past 12 months, has lack of transportation kept you from meetings, work, or from getting things needed for daily living?: No  Was application for public transport provided?: Yes ([place] application reviewd and provided)    DISCHARGE DETAILS:    Discharge planning discussed with[de-identified] patient at bedside  Freedom of Choice: Yes  Comments - Freedom of Choice: CM met with patient at bedside to introduce self/role  Patient is alert and oriented and sitting up in bed  He reports he's independent with ADLs/mobility  He does attend [facility] for Methadone maintenance  He reports he does have friends that can take him to and from treatment but sometimes transportation is difficult for him   [place] application provided at his request  No other CM needs identified at this time  CM contacted family/caregiver?: No- see comments (N/A-no further CM needs identified   patient alert and oriented )  Were Treatment Team discharge recommendations reviewed with patient/caregiver?: Yes  Did patient/caregiver verbalize understanding of patient care needs?: Yes  Were patient/caregiver advised of the risks associated with not following Treatment Team discharge recommendations?: Yes     [address]         Is the patient interested in [facility] at discharge?: No    DME Referral Provided  Referral made for DME?: No    Other Referral/Resources/Interventions Provided:  Interventions: Transportation  Referral Comments: [place] Application providedl    Would you like to participate in our [address] service program?  : No - Declined    Treatment Team Recommendation: Home  Discharge Destination Plan[de-identified] Home  Transport at Discharge : Automobile (friend)

## 2022-05-16 NOTE — PLAN OF CARE
Problem: INFECTION - ADULT  Goal: Absence or prevention of progression during hospitalization  Description: INTERVENTIONS:  - Assess and monitor for signs and symptoms of infection  - Monitor lab/diagnostic results  - Monitor all insertion sites, i e  indwelling lines, tubes, and drains  - Monitor endotracheal if appropriate and nasal secretions for changes in amount and color  - [place] appropriate cooling/warming therapies per order  - Administer medications as ordered  - Instruct and encourage patient and family to use good hand hygiene technique  - Identify and instruct in appropriate isolation precautions for identified infection/condition  Outcome: Progressing     Problem: PAIN - ADULT  Goal: Verbalizes/displays adequate comfort level or baseline comfort level  Description: Interventions:  - Encourage patient to monitor pain and request assistance  - Assess pain using appropriate pain scale  - Administer analgesics based on type and severity of pain and evaluate response  - Implement non-pharmacological measures as appropriate and evaluate response  - Consider cultural and social influences on pain and pain management  - Notify physician/advanced practitioner if interventions unsuccessful or patient reports new pain  Outcome: Progressing

## 2022-05-16 NOTE — QUICK NOTE
AVS reviewed with pt, Iv removed, tobacco cessation education provided to pt  Pt concerns and questions answered

## 2022-05-16 NOTE — NURSING NOTE
Changed the patients dressing because it was saturated with serosanguinous fluid  Left the packing intact  Just applied new gauze and wrapped with rachna

## 2022-05-17 NOTE — UTILIZATION REVIEW
Notification of Discharge   This is a Notification of Discharge from our facility [address]  Please be advised that this patient has been discharge from our facility  Below you will find the admission and discharge date and time including the patients disposition  UTILIZATION REVIEW CONTACT:  Rick Jeffers  Utilization   Network Utilization Review Department  Phone: [phone] x carefully listen to the prompts  All voicemails are confidential   Email: [email or web address]     PHYSICIAN ADVISORY SERVICES:  FOR QEFZ-OB-OYIN REVIEW - MEDICAL NECESSITY DENIAL  Phone: [phone]  Fax: [phone]  Email: [email or web address]     PRESENTATION DATE: 5/15/2022 10:52 AM  OBERVATION ADMISSION DATE:   INPATIENT ADMISSION DATE: 5/15/22 11:38 AM   DISCHARGE DATE: 5/16/2022  6:41 PM  DISPOSITION: Home/Self Care Home/Self Care      IMPORTANT INFORMATION:  Send all requests for admission clinical reviews, approved or denied determinations and any other requests to dedicated fax number below belonging to the campus where the patient is receiving treatment   List of dedicated fax numbers:  [address] [place] [address] DENIALS (Administrative/Medical Necessity) [phone]   [address] [place] [address] (Maternity/NICU/Pediatrics) [phone]   Alec Pickett [phone]   [address] [facility] [phone]   [address] [place] [address] [phone]   [address] [phone]   [facility]  [phone]   [address] [place] [address], [facility]  [address] [facility] [address] [facility] [phone]

## 2022-05-18 ENCOUNTER — TELEPHONE (OUTPATIENT)
Dept: SURGERY | Facility: CLINIC | Age: 47
End: 2022-05-18

## 2022-05-18 LAB
BACTERIA SPEC ANAEROBE CULT: NORMAL
BACTERIA TISS AEROBE CULT: ABNORMAL
GRAM STN SPEC: ABNORMAL
GRAM STN SPEC: ABNORMAL

## 2022-05-18 NOTE — TELEPHONE ENCOUNTER
Pt lm for after care instructions without a callback number, the number in epic is unavailable  Per op notes Shower daily, you can get the wound wet  Let shower soap run over the incision, rinse out and then pat dry  Apply dry sterile dressing after shower    If dressing saturates then change the dressing

## 2022-05-21 LAB
BACTERIA BLD CULT: NORMAL
BACTERIA BLD CULT: NORMAL

## 2022-05-27 ENCOUNTER — OFFICE VISIT (OUTPATIENT)
Dept: SURGERY | Facility: CLINIC | Age: 47
End: 2022-05-27

## 2022-05-27 VITALS
SYSTOLIC BLOOD PRESSURE: 118 MMHG | OXYGEN SATURATION: 93 % | BODY MASS INDEX: 20.14 KG/M2 | DIASTOLIC BLOOD PRESSURE: 82 MMHG | HEIGHT: 69 IN | WEIGHT: 136 LBS | HEART RATE: 63 BPM

## 2022-05-27 DIAGNOSIS — Z48.89 POSTOPERATIVE VISIT: Primary | ICD-10-CM

## 2022-05-27 DIAGNOSIS — L02.414 ABSCESS OF FOREARM, LEFT: ICD-10-CM

## 2022-05-27 PROCEDURE — 99024 POSTOP FOLLOW-UP VISIT: CPT | Performed by: SURGERY

## 2022-05-27 RX ORDER — CLOBETASOL PROPIONATE 0.5 MG/G
OINTMENT TOPICAL
COMMUNITY
Start: 2022-04-27

## 2022-05-27 RX ORDER — METHADONE HYDROCHLORIDE 10 MG/1
10 TABLET ORAL DAILY
COMMUNITY
Start: 2022-05-19 | End: 2022-05-29

## 2022-05-27 NOTE — PROGRESS NOTES
Post-Op Follow Up- General Surgery   Yaima Gonzalez 41 y o  male MRN: [id number]  Unit/Bed#:  Encounter: [id number]    Assessment/Plan     Assessment:  Status post incision and drainage of left forearm abscess, improving  Plan:  Patient is doing quite well from the surgical standpoint, no limitation of the elbow joint  He will continue with daily dressing changes and return to my office in 1 month  History of Present Illness     HPI:  Yaima Gonzalez is a 41 y o  male who presents to my office for 1st postop follow-up after incision and drainage of left forearm abscess from May 15  He offers no complaints at this time, he noted decrease in drainage, he has no pain  He is doing daily dressing changes  Historical Information   History reviewed  No pertinent past medical history    Past Surgical History:   Procedure Laterality Date    INCISION AND DRAINAGE OF WOUND Left 5/15/2022    Procedure: INCISION AND DRAINAGE (I&D) LEFT UPPER EXTREMITY;  Surgeon: Lida Galan MD;  Location: [facility] OR;  Service: General    SHOULDER SURGERY      WISDOM TOOTH EXTRACTION       Social History   Social History     Substance and Sexual Activity   Alcohol Use Never     Social History     Substance and Sexual Activity   Drug Use Yes    Frequency: 1 0 times per week    Types: Marijuana, Heroin    Comment: methadone, heroin use 5/2022     Social History     Tobacco Use   Smoking Status Current Every Day Smoker    Packs/day: 1 00    Types: Cigarettes   Smokeless Tobacco Never Used     Family History: non-contributory    Meds/Allergies   all medications and allergies reviewed     Current Outpatient Medications:     clobetasol (TEMOVATE) 0 05 % ointment, , Disp: , Rfl:     ibuprofen (MOTRIN) 800 mg tablet, Take by mouth every 6 (six) hours as needed for mild pain, Disp: , Rfl:     methadone (DOLOPHINE) 10 mg tablet, Take 10 mg by mouth daily,, Disp: , Rfl:     naproxen (NAPROSYN) 250 mg tablet, Take 1 tablet (250 mg total) by mouth 2 (two) times a day with meals, Disp: 20 tablet, Rfl: 0    triamcinolone (KENALOG) 0 1 % cream, Apply topically 2 (two) times a day Both hands, Disp: 30 g, Rfl: 0  No Known Allergies    Objective     Current Vitals:   Blood Pressure: 118/82 (05/27/22 1134)  Pulse: 63 (05/27/22 1134)  Height: 5' 9" (175 3 cm) (05/27/22 1134)  Weight - Scale: 61 7 kg (136 lb) (05/27/22 1134)  SpO2: 93 % (05/27/22 1134)    Physical Exam  Vitals and nursing note reviewed  Musculoskeletal:      Comments: Wound from the left forearm measure approximately 4 cm long by 0 5 cm wide and it is 0 5 cm deep, no evidence of infection at this time  Swelling and induration has decreased significantly

## 2022-06-27 ENCOUNTER — OFFICE VISIT (OUTPATIENT)
Dept: SURGERY | Facility: CLINIC | Age: 47
End: 2022-06-27

## 2022-06-27 VITALS
SYSTOLIC BLOOD PRESSURE: 102 MMHG | HEART RATE: 83 BPM | DIASTOLIC BLOOD PRESSURE: 66 MMHG | HEIGHT: 69 IN | BODY MASS INDEX: 19.55 KG/M2 | OXYGEN SATURATION: 98 % | WEIGHT: 132 LBS

## 2022-06-27 DIAGNOSIS — Z48.89 POSTOPERATIVE VISIT: Primary | ICD-10-CM

## 2022-06-27 DIAGNOSIS — L02.414 ABSCESS OF FOREARM, LEFT: ICD-10-CM

## 2022-06-27 PROCEDURE — 99024 POSTOP FOLLOW-UP VISIT: CPT | Performed by: SURGERY

## 2022-06-27 NOTE — PROGRESS NOTES
Post-Op Follow Up- General Surgery   Truong Champion 41 y o  male MRN: [id number]  Unit/Bed#:  Encounter: [id number]    Assessment/Plan     Assessment:    Status post incision and drainage left forearm abscess, improved  Plan:   patient did quite well from the surgical standpoint  Wound is completely healed without any residual damage to the left upper extremity  Patient is discharged from my care and I will be glad to see him if any problem arises in the future  History of Present Illness     HPI:  Truong Champion is a 41 y o  male who presents   To my office for 2nd postop follow-up after incision and drainage left forearm abscess  Patient offers no complaints at this time  He stated wound is completely healed  Historical Information   History reviewed  No pertinent past medical history    Past Surgical History:   Procedure Laterality Date    INCISION AND DRAINAGE OF WOUND Left 5/15/2022    Procedure: INCISION AND DRAINAGE (I&D) LEFT UPPER EXTREMITY;  Surgeon: Jane Martinez MD;  Location: [facility] [place] OR;  Service: General    SHOULDER SURGERY      WISDOM TOOTH EXTRACTION       Social History   Social History     Substance and Sexual Activity   Alcohol Use Never     Social History     Substance and Sexual Activity   Drug Use Yes    Frequency: 1 0 times per week    Types: Marijuana, Heroin    Comment: methadone, heroin use 5/2022     Social History     Tobacco Use   Smoking Status Current Every Day Smoker    Packs/day: 1 00    Types: Cigarettes   Smokeless Tobacco Never Used     Family History: non-contributory    Meds/Allergies   all medications and allergies reviewed     Current Outpatient Medications:     clobetasol (TEMOVATE) 0 05 % ointment, , Disp: , Rfl:     triamcinolone (KENALOG) 0 1 % cream, Apply topically 2 (two) times a day Both hands, Disp: 30 g, Rfl: 0    ibuprofen (MOTRIN) 800 mg tablet, Take by mouth every 6 (six) hours as needed for mild pain (Patient not taking: Reported on 6/27/2022), Disp: , Rfl:     naproxen (NAPROSYN) 250 mg tablet, Take 1 tablet (250 mg total) by mouth 2 (two) times a day with meals (Patient not taking: Reported on 6/27/2022), Disp: 20 tablet, Rfl: 0  No Known Allergies    Objective     Current Vitals:   Blood Pressure: 102/66 (06/27/22 1316)  Pulse: 83 (06/27/22 1316)  Height: 5' 9" (175 3 cm) (06/27/22 1316)  Weight - Scale: 59 9 kg (132 lb) (06/27/22 1316)  SpO2: 98 % (06/27/22 1316)  Physical Exam  Vitals and nursing note reviewed  Musculoskeletal:      Comments:   Incision from the left forearm is well-healed without evidence of infection  There is no residual abscess  The patient has no motor or sensory deficit of the left upper extremity including hand

## 2022-09-21 NOTE — ANESTHESIA POSTPROCEDURE EVALUATION
Post-Op Assessment Note    CV Status:  Stable  Pain Score: 1    Pain management: adequate     Mental Status:  Alert and awake   Hydration Status:  Euvolemic   PONV Controlled:  Controlled   Airway Patency:  Patent      Post Op Vitals Reviewed: Yes      Staff: CRNA         No complications documented      BP   123/87   Temp 97 9   Pulse 102   Resp 16   SpO2 102 Body Location Override (Optional - Billing Will Still Be Based On Selected Body Map Location If Applicable): right elbow Detail Level: Detailed Depth Of Biopsy: dermis Was A Bandage Applied: Yes Size Of Lesion In Cm: 1 X Size Of Lesion In Cm: 0 Biopsy Type: H and E Biopsy Method: Dermablade Anesthesia Type: 1% lidocaine with epinephrine Anesthesia Volume In Cc (Will Not Render If 0): 1.5 Hemostasis: Electrocautery Wound Care: Bacitracin Dressing: bandage Destruction After The Procedure: No Type Of Destruction Used: Curettage Curettage Text: The wound bed was treated with curettage after the biopsy was performed. Cryotherapy Text: The wound bed was treated with cryotherapy after the biopsy was performed. Electrodesiccation Text: The wound bed was treated with electrodesiccation after the biopsy was performed. Electrodesiccation And Curettage Text: The wound bed was treated with electrodesiccation and curettage after the biopsy was performed. Silver Nitrate Text: The wound bed was treated with silver nitrate after the biopsy was performed. Path Notes (To The Dermatopathologist): Size: 1.0 Consent: Written consent was obtained and risks were reviewed including but not limited to scarring, infection, bleeding, scabbing, incomplete removal, nerve damage and allergy to anesthesia. Post-Care Instructions: I reviewed with the patient in detail post-care instructions. Patient is to keep the biopsy site dry overnight, and then apply bacitracin twice daily until healed. Patient may apply hydrogen peroxide soaks to remove any crusting. Notification Instructions: Patient will be notified of biopsy results. However, patient instructed to call the office if not contacted within 2 weeks. Billing Type: [employer] Information: Selecting Yes will display possible errors in your note based on the variables you have selected. This validation is only offered as a suggestion for you. PLEASE NOTE THAT THE VALIDATION TEXT WILL BE REMOVED WHEN YOU FINALIZE YOUR NOTE. IF YOU WANT TO FAX A PRELIMINARY NOTE YOU WILL NEED TO TOGGLE THIS TO 'NO' IF YOU DO NOT WANT IT IN YOUR FAXED NOTE.

## 2022-12-24 ENCOUNTER — APPOINTMENT (EMERGENCY)
Dept: CT IMAGING | Facility: HOSPITAL | Age: 47
End: 2022-12-24

## 2022-12-24 ENCOUNTER — HOSPITAL ENCOUNTER (EMERGENCY)
Facility: HOSPITAL | Age: 47
Discharge: HOME | End: 2022-12-24
Attending: EMERGENCY MEDICINE

## 2022-12-24 VITALS
HEART RATE: 79 BPM | RESPIRATION RATE: 12 BRPM | DIASTOLIC BLOOD PRESSURE: 90 MMHG | OXYGEN SATURATION: 98 % | TEMPERATURE: 97.4 F | SYSTOLIC BLOOD PRESSURE: 138 MMHG

## 2022-12-24 DIAGNOSIS — L02.91 ABSCESS: Primary | ICD-10-CM

## 2022-12-24 LAB
ALBUMIN SERPL BCP-MCNC: 3.7 G/DL (ref 3.5–5)
ALP SERPL-CCNC: 133 U/L (ref 46–116)
ALT SERPL W P-5'-P-CCNC: 27 U/L (ref 12–78)
ANION GAP SERPL CALCULATED.3IONS-SCNC: 9 MMOL/L (ref 4–13)
AST SERPL W P-5'-P-CCNC: 24 U/L (ref 5–45)
BASOPHILS # BLD AUTO: 0.03 THOUSANDS/ÂΜL (ref 0–0.1)
BASOPHILS NFR BLD AUTO: 0 % (ref 0–1)
BILIRUB SERPL-MCNC: 0.27 MG/DL (ref 0.2–1)
BUN SERPL-MCNC: 7 MG/DL (ref 5–25)
CALCIUM SERPL-MCNC: 9 MG/DL (ref 8.3–10.1)
CHLORIDE SERPL-SCNC: 96 MMOL/L (ref 96–108)
CO2 SERPL-SCNC: 30 MMOL/L (ref 21–32)
CREAT SERPL-MCNC: 0.8 MG/DL (ref 0.6–1.3)
EOSINOPHIL # BLD AUTO: 0.02 THOUSAND/ÂΜL (ref 0–0.61)
EOSINOPHIL NFR BLD AUTO: 0 % (ref 0–6)
ERYTHROCYTE [DISTWIDTH] IN BLOOD BY AUTOMATED COUNT: 13.4 % (ref 11.6–15.1)
GFR SERPL CREATININE-BSD FRML MDRD: 107 ML/MIN/1.73SQ M
GLUCOSE SERPL-MCNC: 118 MG/DL (ref 65–140)
HCT VFR BLD AUTO: 37.1 % (ref 36.5–49.3)
HGB BLD-MCNC: 12.3 G/DL (ref 12–17)
IMM GRANULOCYTES # BLD AUTO: 0.04 THOUSAND/UL (ref 0–0.2)
IMM GRANULOCYTES NFR BLD AUTO: 0 % (ref 0–2)
LACTATE SERPL-SCNC: 1.2 MMOL/L (ref 0.5–2)
LYMPHOCYTES # BLD AUTO: 0.71 THOUSANDS/ÂΜL (ref 0.6–4.47)
LYMPHOCYTES NFR BLD AUTO: 5 % (ref 14–44)
MCH RBC QN AUTO: 30.3 PG (ref 26.8–34.3)
MCHC RBC AUTO-ENTMCNC: 33.2 G/DL (ref 31.4–37.4)
MCV RBC AUTO: 91 FL (ref 82–98)
MONOCYTES # BLD AUTO: 1.3 THOUSAND/ÂΜL (ref 0.17–1.22)
MONOCYTES NFR BLD AUTO: 10 % (ref 4–12)
NEUTROPHILS # BLD AUTO: 10.94 THOUSANDS/ÂΜL (ref 1.85–7.62)
NEUTS SEG NFR BLD AUTO: 85 % (ref 43–75)
NRBC BLD AUTO-RTO: 0 /100 WBCS
PLATELET # BLD AUTO: 458 THOUSANDS/UL (ref 149–390)
PMV BLD AUTO: 9.1 FL (ref 8.9–12.7)
POTASSIUM SERPL-SCNC: 4 MMOL/L (ref 3.5–5.3)
PROT SERPL-MCNC: 8.3 G/DL (ref 6.4–8.4)
RBC # BLD AUTO: 4.06 MILLION/UL (ref 3.88–5.62)
SODIUM SERPL-SCNC: 135 MMOL/L (ref 135–147)
WBC # BLD AUTO: 13.04 THOUSAND/UL (ref 4.31–10.16)

## 2022-12-24 RX ORDER — LIDOCAINE HYDROCHLORIDE 20 MG/ML
10 INJECTION, SOLUTION EPIDURAL; INFILTRATION; INTRACAUDAL; PERINEURAL ONCE
Status: COMPLETED | OUTPATIENT
Start: 2022-12-24 | End: 2022-12-24

## 2022-12-24 RX ORDER — CLINDAMYCIN HYDROCHLORIDE 150 MG/1
450 CAPSULE ORAL EVERY 8 HOURS SCHEDULED
Qty: 28 CAPSULE | Refills: 0 | Status: SHIPPED | OUTPATIENT
Start: 2022-12-24 | End: 2022-12-31

## 2022-12-24 RX ORDER — CLINDAMYCIN PHOSPHATE 600 MG/50ML
600 INJECTION INTRAVENOUS ONCE
Status: COMPLETED | OUTPATIENT
Start: 2022-12-24 | End: 2022-12-24

## 2022-12-24 RX ORDER — ACETAMINOPHEN 325 MG/1
975 TABLET ORAL ONCE
Status: COMPLETED | OUTPATIENT
Start: 2022-12-24 | End: 2022-12-24

## 2022-12-24 RX ORDER — KETOROLAC TROMETHAMINE 30 MG/ML
15 INJECTION, SOLUTION INTRAMUSCULAR; INTRAVENOUS ONCE
Status: COMPLETED | OUTPATIENT
Start: 2022-12-24 | End: 2022-12-24

## 2022-12-24 RX ADMIN — ACETAMINOPHEN 975 MG: 325 TABLET, FILM COATED ORAL at 09:53

## 2022-12-24 RX ADMIN — CLINDAMYCIN PHOSPHATE 600 MG: 600 INJECTION, SOLUTION INTRAVENOUS at 12:15

## 2022-12-24 RX ADMIN — LIDOCAINE HYDROCHLORIDE 10 ML: 20 INJECTION, SOLUTION EPIDURAL; INFILTRATION; INTRACAUDAL; PERINEURAL at 12:15

## 2022-12-24 RX ADMIN — KETOROLAC TROMETHAMINE 15 MG: 30 INJECTION, SOLUTION INTRAMUSCULAR; INTRAVENOUS at 09:53

## 2022-12-24 RX ADMIN — IOHEXOL 99 ML: 350 INJECTION, SOLUTION INTRAVENOUS at 10:53

## 2022-12-24 NOTE — ED PROVIDER NOTES
History  Chief Complaint   Patient presents with   • Abscess     Reported abscess to anterior L knee      Patient is a 61-year-old male past medical history of IVDA presenting with abscess to left knee  Patient states that 5 days ago he was putting down concrete and has scabs to his knees and that 4 days ago he noticed painful swelling to the area which she states is worsened over the past 2 days acutely and is consistent with prior abscesses  States that it was draining blood and clear fluid however has not drained recently  Was taking ibuprofen with no relief, none this morning  No severe throbbing nonradiating pain to the area which is intermittent and worse with pressure and movement  Denies any history of IV drug use in the last several weeks and states that when he injects he injects in his arms and feet  Denies any numbness or tingling or fevers  Prior to Admission Medications   Prescriptions Last Dose Informant Patient Reported? Taking? clobetasol (TEMOVATE) 0 05 % ointment   Yes No   ibuprofen (MOTRIN) 800 mg tablet   Yes No   Sig: Take by mouth every 6 (six) hours as needed for mild pain   Patient not taking: Reported on 6/27/2022   naproxen (NAPROSYN) 250 mg tablet   No No   Sig: Take 1 tablet (250 mg total) by mouth 2 (two) times a day with meals   Patient not taking: Reported on 6/27/2022   triamcinolone (KENALOG) 0 1 % cream   No No   Sig: Apply topically 2 (two) times a day Both hands      Facility-Administered Medications: None       No past medical history on file  Past Surgical History:   Procedure Laterality Date   • INCISION AND DRAINAGE OF WOUND Left 5/15/2022    Procedure: INCISION AND DRAINAGE (I&D) LEFT UPPER EXTREMITY;  Surgeon: Benja Clay MD;  Location: [facility];  Service: General   • SHOULDER SURGERY     • WISDOM TOOTH EXTRACTION         No family history on file  I have reviewed and agree with the history as documented      E-Cigarette/Vaping   • E-Cigarette Use Never User      E-Cigarette/Vaping Substances     Social History     Tobacco Use   • Smoking status: Every Day     Packs/day: 1 00     Types: Cigarettes   • Smokeless tobacco: Never   Vaping Use   • Vaping Use: Never used   Substance Use Topics   • Alcohol use: Never   • Drug use: Yes     Frequency: 1 0 times per week     Types: Marijuana, Heroin     Comment: methadone, heroin use 5/2022       Review of Systems   All other systems reviewed and are negative  Physical Exam  Physical Exam  Vitals reviewed  Constitutional:       General: He is not in acute distress  Appearance: Normal appearance  He is not ill-appearing  HENT:      Mouth/Throat:      Mouth: Mucous membranes are moist    Eyes:      Conjunctiva/sclera: Conjunctivae normal    Cardiovascular:      Rate and Rhythm: Normal rate  Pulses: Normal pulses  Pulmonary:      Effort: Pulmonary effort is normal    Musculoskeletal:         General: Swelling and tenderness present  Normal range of motion  Cervical back: Neck supple  Comments: Patient has roughly 5 x 3 cm abscess with fluctuance, surrounding erythema, no purulent drainage currently to the anterior portion of the left leg overlying the knee and inferior to the knee with full range of motion of the knee, no apparent edema to the joint itself, no tenderness to the venous system   Skin:     General: Skin is warm and dry  Capillary Refill: Capillary refill takes less than 2 seconds  Neurological:      General: No focal deficit present  Mental Status: He is alert  Sensory: No sensory deficit  Motor: No weakness     Psychiatric:         Mood and Affect: Mood normal          Vital Signs  ED Triage Vitals [12/24/22 0914]   Temperature Pulse Respirations Blood Pressure SpO2   (!) 97 4 °F (36 3 °C) 98 16 (!) 171/87 98 %      Temp Source Heart Rate Source Patient Position - Orthostatic VS BP Location FiO2 (%)   Tympanic Monitor Sitting Left arm --      Pain Score --           Vitals:    12/24/22 0914   BP: (!) 171/87   Pulse: 98   Patient Position - Orthostatic VS: Sitting         Visual Acuity      ED Medications  Medications   ketorolac (TORADOL) injection 15 mg (has no administration in time range)   acetaminophen (TYLENOL) tablet 975 mg (has no administration in time range)       Diagnostic Studies  Results Reviewed     None                 No orders to display              Procedures  Incision and drain    Date/Time: 12/24/2022 2:12 PM  Performed by: Nilesh Landrum DO  Authorized by: Nilesh Landrum DO   Universal Protocol:  Consent: Verbal consent obtained  Consent given by: patient  Patient understanding: patient states understanding of the procedure being performed  Patient consent: the patient's understanding of the procedure matches consent given  Procedure consent: procedure consent matches procedure scheduled  Relevant documents: relevant documents present and verified  Test results: test results available and properly labeled  Site marked: the operative site was marked  Radiology Images displayed and confirmed  If images not available, report reviewed: imaging studies available  Patient identity confirmed: verbally with patient      Patient location:  ED  Location:     Type:  Abscess    Location:  Lower extremity    Lower extremity location:  L leg  Pre-procedure details:     Skin preparation:  Chloraprep  Anesthesia (see MAR for exact dosages): Anesthesia method:  Local infiltration    Local anesthetic:  Lidocaine 2% w/o epi  Procedure details:     Complexity:  Simple    Incision types:  Single straight    Scalpel blade:  11    Approach:  Open    Incision depth:  Subcutaneous    Wound management:  Probed and deloculated and irrigated with saline    Drainage:  Bloody and purulent    Drainage amount:  Copious    Wound treatment:  Wound left open  Post-procedure details:     Patient tolerance of procedure:   Tolerated well, no immediate complications             ED Course  ED Course as of 12/24/22 1412   Sat Dec 24, 2022   1255 Large bloody purulent drainage, will give outpatient clindamycin, have discussed return precautions, review of return precautions, have advised return in 2 to 3 days for wound check and patient states he understands  MDM  Number of Diagnoses or Management Options  Diagnosis management comments: Patient is a 82-year-old male past medical history of IVDA presenting with left leg abscess  Patient is well-appearing at bedside though hypertensive, likely secondary to pain as he does appear very uncomfortable  He has roughly 5 x 3 cm abscess to the left leg overlying the knee and inferior to the knee with no purulent drainage but with fluctuance and surrounding erythema, no tenderness to the venous system, full and intact range of motion of the knee, have low concern for septic joint and feel that this is likely localized skin infection however will obtain CT to assess for any extension or involvement of vasculature prior to I&D, will give IV antibiotics and pain control, obtain labs and reassess  Disposition  Final diagnoses:   None     ED Disposition     None      Follow-up Information    None         Patient's Medications   Discharge Prescriptions    No medications on file       No discharge procedures on file      PDMP Review     None          ED Provider  Electronically Signed by           Nia Holland DO  12/24/22 1416

## 2022-12-29 LAB
BACTERIA BLD CULT: NORMAL
BACTERIA BLD CULT: NORMAL

## (undated) DEVICE — POOLE SUCTION HANDLE: Brand: CARDINAL HEALTH

## (undated) DEVICE — SPONGE STICK WITH PVP-I: Brand: KENDALL

## (undated) DEVICE — ARM SLING: Brand: DEROYAL

## (undated) DEVICE — ABDOMINAL PAD: Brand: DERMACEA

## (undated) DEVICE — LIGHT HANDLE COVER SLEEVE DISP BLUE STELLAR

## (undated) DEVICE — GLOVE INDICATOR PI UNDERGLOVE SZ 7.5 BLUE

## (undated) DEVICE — BETHLEHEM UNIVERSAL MINOR GEN: Brand: CARDINAL HEALTH

## (undated) DEVICE — DRAPE EQUIPMENT RF WAND

## (undated) DEVICE — GLOVE SRG BIOGEL ECLIPSE 7.5

## (undated) DEVICE — PAD GROUNDING ADULT

## (undated) DEVICE — SCD SEQUENTIAL COMPRESSION COMFORT SLEEVE MEDIUM KNEE LENGTH: Brand: KENDALL SCD

## (undated) DEVICE — GAUZE SPONGES,16 PLY: Brand: CURITY

## (undated) DEVICE — CULTURE TUBE ANAEROBIC

## (undated) DEVICE — TUBING SUCTION 5MM X 12 FT

## (undated) DEVICE — CHLORAPREP HI-LITE 26ML ORANGE

## (undated) DEVICE — NEEDLE 25G X 1 1/2

## (undated) DEVICE — KERLIX BANDAGE ROLL: Brand: KERLIX

## (undated) DEVICE — CULTURE TUBE AEROBIC

## (undated) DEVICE — INTENDED FOR TISSUE SEPARATION, AND OTHER PROCEDURES THAT REQUIRE A SHARP SURGICAL BLADE TO PUNCTURE OR CUT.: Brand: BARD-PARKER SAFETY BLADES SIZE 15, STERILE